# Patient Record
Sex: MALE | Race: WHITE | NOT HISPANIC OR LATINO | Employment: OTHER | URBAN - METROPOLITAN AREA
[De-identification: names, ages, dates, MRNs, and addresses within clinical notes are randomized per-mention and may not be internally consistent; named-entity substitution may affect disease eponyms.]

---

## 2020-05-04 ENCOUNTER — TELEPHONE (OUTPATIENT)
Dept: CARDIOLOGY | Facility: CLINIC | Age: 75
End: 2020-05-04

## 2020-05-04 NOTE — TELEPHONE ENCOUNTER
Received voice mail from patient.  Reached out to patient to let him know his message would be forwarded to Dr Barboza's nurse.

## 2020-05-19 ENCOUNTER — OFFICE VISIT (OUTPATIENT)
Dept: CARDIOLOGY | Facility: CLINIC | Age: 75
End: 2020-05-19
Payer: MEDICARE

## 2020-05-19 VITALS
DIASTOLIC BLOOD PRESSURE: 86 MMHG | BODY MASS INDEX: 24.52 KG/M2 | SYSTOLIC BLOOD PRESSURE: 142 MMHG | HEART RATE: 61 BPM | WEIGHT: 185 LBS | HEIGHT: 73 IN

## 2020-05-19 DIAGNOSIS — I10 ESSENTIAL HYPERTENSION: ICD-10-CM

## 2020-05-19 DIAGNOSIS — E78.00 HYPERCHOLESTEREMIA: ICD-10-CM

## 2020-05-19 DIAGNOSIS — I25.10 ATHEROSCLEROSIS OF NATIVE CORONARY ARTERY OF NATIVE HEART WITHOUT ANGINA PECTORIS: Primary | ICD-10-CM

## 2020-05-19 PROCEDURE — 99204 PR OFFICE/OUTPT VISIT, NEW, LEVL IV, 45-59 MIN: ICD-10-PCS | Mod: S$PBB,,, | Performed by: INTERNAL MEDICINE

## 2020-05-19 PROCEDURE — 99999 PR PBB SHADOW E&M-EST. PATIENT-LVL III: ICD-10-PCS | Mod: PBBFAC,,, | Performed by: INTERNAL MEDICINE

## 2020-05-19 PROCEDURE — 99213 OFFICE O/P EST LOW 20 MIN: CPT | Mod: PBBFAC | Performed by: INTERNAL MEDICINE

## 2020-05-19 PROCEDURE — 99999 PR PBB SHADOW E&M-EST. PATIENT-LVL III: CPT | Mod: PBBFAC,,, | Performed by: INTERNAL MEDICINE

## 2020-05-19 PROCEDURE — 99204 OFFICE O/P NEW MOD 45 MIN: CPT | Mod: S$PBB,,, | Performed by: INTERNAL MEDICINE

## 2020-05-19 RX ORDER — TAMSULOSIN HYDROCHLORIDE 0.4 MG/1
0.4 CAPSULE ORAL DAILY
COMMUNITY
Start: 2020-05-10

## 2020-05-19 RX ORDER — LISINOPRIL 10 MG/1
10 TABLET ORAL DAILY
COMMUNITY
Start: 2020-03-15 | End: 2020-12-14 | Stop reason: SDUPTHER

## 2020-05-19 RX ORDER — ATENOLOL 25 MG/1
25 TABLET ORAL DAILY
COMMUNITY
Start: 2020-04-19 | End: 2021-01-19 | Stop reason: SDUPTHER

## 2020-05-19 RX ORDER — ATORVASTATIN CALCIUM 20 MG/1
20 TABLET, FILM COATED ORAL DAILY
COMMUNITY
Start: 2020-05-10 | End: 2020-06-12 | Stop reason: SDUPTHER

## 2020-05-19 RX ORDER — MECLIZINE HYDROCHLORIDE 25 MG/1
25 TABLET ORAL DAILY PRN
COMMUNITY
Start: 2020-04-16

## 2020-05-19 RX ORDER — DONEPEZIL HYDROCHLORIDE 5 MG/1
5 TABLET, FILM COATED ORAL DAILY
COMMUNITY
Start: 2020-05-10 | End: 2021-05-18

## 2020-05-19 RX ORDER — MONTELUKAST SODIUM 10 MG/1
10 TABLET ORAL DAILY PRN
COMMUNITY

## 2020-06-12 DIAGNOSIS — E78.00 HYPERCHOLESTEREMIA: Primary | ICD-10-CM

## 2020-06-12 RX ORDER — ATORVASTATIN CALCIUM 20 MG/1
20 TABLET, FILM COATED ORAL DAILY
Qty: 90 TABLET | Refills: 3 | Status: SHIPPED | OUTPATIENT
Start: 2020-06-12 | End: 2020-09-14 | Stop reason: SDUPTHER

## 2020-06-12 NOTE — TELEPHONE ENCOUNTER
----- Message from Juliette Osei sent at 6/12/2020 10:55 AM CDT -----  Contact: patient  Patient is requesting a refill on medication.  ATORVASTATIN 20mg.  Patient is completely out of this medication. .

## 2020-09-14 DIAGNOSIS — E78.00 HYPERCHOLESTEREMIA: ICD-10-CM

## 2020-09-14 RX ORDER — ATORVASTATIN CALCIUM 20 MG/1
20 TABLET, FILM COATED ORAL DAILY
Qty: 90 TABLET | Refills: 3 | Status: SHIPPED | OUTPATIENT
Start: 2020-09-14 | End: 2021-10-12

## 2020-11-18 ENCOUNTER — OFFICE VISIT (OUTPATIENT)
Dept: CARDIOLOGY | Facility: CLINIC | Age: 75
End: 2020-11-18
Payer: MEDICARE

## 2020-11-18 VITALS
HEART RATE: 52 BPM | DIASTOLIC BLOOD PRESSURE: 90 MMHG | SYSTOLIC BLOOD PRESSURE: 158 MMHG | WEIGHT: 191.81 LBS | HEIGHT: 73 IN | BODY MASS INDEX: 25.42 KG/M2

## 2020-11-18 DIAGNOSIS — E78.00 HYPERCHOLESTEREMIA: ICD-10-CM

## 2020-11-18 DIAGNOSIS — I25.10 ATHEROSCLEROSIS OF NATIVE CORONARY ARTERY OF NATIVE HEART WITHOUT ANGINA PECTORIS: Primary | ICD-10-CM

## 2020-11-18 DIAGNOSIS — I10 ESSENTIAL HYPERTENSION: ICD-10-CM

## 2020-11-18 PROCEDURE — 99214 PR OFFICE/OUTPT VISIT, EST, LEVL IV, 30-39 MIN: ICD-10-PCS | Mod: 25,S$PBB,, | Performed by: INTERNAL MEDICINE

## 2020-11-18 PROCEDURE — 99999 PR PBB SHADOW E&M-EST. PATIENT-LVL III: CPT | Mod: PBBFAC,,, | Performed by: INTERNAL MEDICINE

## 2020-11-18 PROCEDURE — 99214 OFFICE O/P EST MOD 30 MIN: CPT | Mod: 25,S$PBB,, | Performed by: INTERNAL MEDICINE

## 2020-11-18 PROCEDURE — 99213 OFFICE O/P EST LOW 20 MIN: CPT | Mod: PBBFAC,25 | Performed by: INTERNAL MEDICINE

## 2020-11-18 PROCEDURE — 93010 ELECTROCARDIOGRAM REPORT: CPT | Mod: ,,, | Performed by: INTERNAL MEDICINE

## 2020-11-18 PROCEDURE — 99999 PR PBB SHADOW E&M-EST. PATIENT-LVL III: ICD-10-PCS | Mod: PBBFAC,,, | Performed by: INTERNAL MEDICINE

## 2020-11-18 PROCEDURE — 93005 ELECTROCARDIOGRAM TRACING: CPT

## 2020-11-18 PROCEDURE — 93010 EKG 12-LEAD: ICD-10-PCS | Mod: ,,, | Performed by: INTERNAL MEDICINE

## 2020-11-18 NOTE — PROGRESS NOTES
[facility]    Chief Complaint  Chief Complaint   Patient presents with    Coronary Artery Disease       HPI:    Aches and pains.  But remains active.  No problems with exertional dyspnea or chest discomfort.  Primary limitation seems to be neuropathy of both lower extremities.  Walks 3 miles at least 5 days per week.  More forgetful.  Not sure of Aricept has helped.    Medications  Current Outpatient Medications   Medication Sig Dispense Refill    aspirin (ECOTRIN) 325 MG EC tablet Take 325 mg by mouth once daily.      atenoloL (TENORMIN) 25 MG tablet Take 25 mg by mouth once daily.      atorvastatin (LIPITOR) 20 MG tablet Take 1 tablet (20 mg total) by mouth once daily. 90 tablet 3    diazepam (VALIUM ORAL) Take by mouth.      donepeziL (ARICEPT) 5 MG tablet Take 5 mg by mouth once daily.      lisinopriL 10 MG tablet Take 10 mg by mouth once daily.      meclizine (ANTIVERT) 25 mg tablet Take 25 mg by mouth daily as needed.      montelukast (SINGULAIR) 10 mg tablet Take 10 mg by mouth daily as needed.      tamsulosin (FLOMAX) 0.4 mg Cap Take 0.4 mg by mouth once daily.       No current facility-administered medications for this visit.         History  Past Medical History:   Diagnosis Date    Coronary atherosclerosis     Essential hypertension     Hypercholesteremia     Labyrinthitis     Stroke 2011    diplopia. Neuro-ophtho dx'd possible microstroke    Vertigo      Past Surgical History:   Procedure Laterality Date    CORONARY STENT PLACEMENT  12/18/2000    prox LAD Tetra 3.5 x 18 mm, mid LAD Tetra 3 x 18 mm    CORONARY STENT PLACEMENT  11/22/2004    prox LCx Cypher 2.5 x 13 mm, OM POBA 2 mm     Social History     Socioeconomic History    Marital status: Single     Spouse name: Not on file    Number of children: Not on file    Years of education: Not on file    Highest education level: Not on file   Occupational History    Not on file   Social Needs    Financial resource  strain: Not on file    Food insecurity     Worry: Not on file     Inability: Not on file    Transportation needs     Medical: Not on file     Non-medical: Not on file   Tobacco Use    Smoking status: Former Smoker     Types: Cigars    Smokeless tobacco: Never Used   Substance and Sexual Activity    Alcohol use: Yes     Frequency: 2-3 times a week    Drug use: Not on file    Sexual activity: Not on file   Lifestyle    Physical activity     Days per week: Not on file     Minutes per session: Not on file    Stress: Not on file   Relationships    Social connections     Talks on phone: Not on file     Gets together: Not on file     Attends Anabaptist service: Not on file     Active member of club or organization: Not on file     Attends meetings of clubs or organizations: Not on file     Relationship status: Not on file   Other Topics Concern    Not on file   Social History Narrative    Not on file     Family History   Problem Relation Age of Onset    Heart disease Neg Hx     Hypertension Neg Hx         Allergies  Review of patient's allergies indicates:  No Known Allergies    Review of Systems   Review of Systems   Constitution: Negative for malaise/fatigue, weight gain and weight loss.   Eyes: Negative for visual disturbance.   Cardiovascular: Negative for chest pain, claudication, cyanosis, dyspnea on exertion, irregular heartbeat, leg swelling, near-syncope, orthopnea, palpitations, paroxysmal nocturnal dyspnea and syncope.   Respiratory: Negative for cough, hemoptysis, shortness of breath, sleep disturbances due to breathing and wheezing.    Hematologic/Lymphatic: Negative for bleeding problem. Does not bruise/bleed easily.   Skin: Negative for poor wound healing.   Musculoskeletal: Positive for back pain. Negative for muscle cramps and myalgias.   Gastrointestinal: Negative for abdominal pain, anorexia, diarrhea, heartburn, hematemesis, hematochezia, melena, nausea and vomiting.   Genitourinary:  Negative for hematuria and nocturia.   Neurological: Positive for vertigo (Chronic and unchanged). Negative for excessive daytime sleepiness, dizziness, focal weakness, light-headedness and weakness.   Psychiatric/Behavioral: Positive for memory loss.       Physical Exam  Vitals:    11/18/20 1320   BP: (!) 158/90   Pulse: (!) 52     Wt Readings from Last 1 Encounters:   11/18/20 87 kg (191 lb 12.8 oz)     Physical Exam   Constitutional: He is oriented to person, place, and time. He is cooperative.  Non-toxic appearance. No distress.   HENT:   Head: Normocephalic and atraumatic.   Eyes: Conjunctivae are normal. No scleral icterus.   Neck: Neck supple. No hepatojugular reflux and no JVD present. Carotid bruit is not present. No tracheal deviation and no edema present. No thyromegaly present.   Cardiovascular: Normal rate, regular rhythm, S1 normal and S2 normal.  No extrasystoles are present. PMI is not displaced. Exam reveals no S3 and no S4.   No murmur heard.  Pulses:       Carotid pulses are 2+ on the right side and 2+ on the left side.       Radial pulses are 2+ on the right side and 2+ on the left side.        Dorsalis pedis pulses are 2+ on the right side and 2+ on the left side.        Posterior tibial pulses are 2+ on the right side and 2+ on the left side.   Pulmonary/Chest: No accessory muscle usage. No respiratory distress. He has no decreased breath sounds. He has no wheezes. He has no rhonchi. He has no rales.   Abdominal: Soft. Bowel sounds are normal. He exhibits no pulsatile liver, no abdominal bruit and no pulsatile midline mass. There is no splenomegaly or hepatomegaly. There is no abdominal tenderness.   Musculoskeletal:         General: No tenderness, deformity or edema.   Neurological: He is alert and oriented to person, place, and time. He has normal strength. No cranial nerve deficit or sensory deficit.   Skin: Skin is warm, dry and intact. He is not diaphoretic. No cyanosis. No pallor. Nails  show no clubbing.   Psychiatric: He has a normal mood and affect. His speech is normal and behavior is normal.       Labs  No visits with results within 6 Month(s) from this visit.   Latest known visit with results is:   No results found for any previous visit.       Imaging  No results found.    Assessment  1. Hypercholesteremia  Time for reassessment  - Comprehensive Metabolic Panel  - Lipid Panel    2. Atherosclerosis of native coronary artery of native heart without angina pectoris  Stable    3.  Essential hypertension  Blood pressure is elevated today.  Generally well controlled on current regimen.      Plan and Discussion    He has an appointment with his primary in 10 days.  Will get his blood pressure rechecked at that time.  If still elevated,  would increase lisinopril.  Otherwise, continue current guideline directed medical therapy.    Follow Up  Follow up in about 6 months (around 5/18/2021).      Adolph Barboza MD

## 2020-12-14 DIAGNOSIS — I10 ESSENTIAL HYPERTENSION: Primary | ICD-10-CM

## 2020-12-14 RX ORDER — LISINOPRIL 10 MG/1
10 TABLET ORAL DAILY
Qty: 90 TABLET | Refills: 3 | Status: CANCELLED | OUTPATIENT
Start: 2020-12-14

## 2020-12-14 RX ORDER — LISINOPRIL 10 MG/1
10 TABLET ORAL DAILY
Qty: 90 TABLET | Refills: 3 | Status: SHIPPED | OUTPATIENT
Start: 2020-12-14 | End: 2021-12-09

## 2020-12-14 NOTE — TELEPHONE ENCOUNTER
Left VM to return call and confirm Lisinopril dose for refill request. Make sure no change in dose per LOV with elevated BP reading.

## 2021-01-10 ENCOUNTER — IMMUNIZATION (OUTPATIENT)
Dept: INTERNAL MEDICINE | Facility: CLINIC | Age: 76
End: 2021-01-10
Payer: MEDICARE

## 2021-01-10 DIAGNOSIS — Z23 NEED FOR VACCINATION: ICD-10-CM

## 2021-01-10 PROCEDURE — 91300 COVID-19, MRNA, LNP-S, PF, 30 MCG/0.3 ML DOSE VACCINE: CPT | Mod: PBBFAC

## 2021-01-19 RX ORDER — ATENOLOL 25 MG/1
25 TABLET ORAL DAILY
Qty: 90 TABLET | Refills: 3 | Status: SHIPPED | OUTPATIENT
Start: 2021-01-19 | End: 2021-12-09

## 2021-01-31 ENCOUNTER — IMMUNIZATION (OUTPATIENT)
Dept: INTERNAL MEDICINE | Facility: CLINIC | Age: 76
End: 2021-01-31
Payer: MEDICARE

## 2021-01-31 DIAGNOSIS — Z23 NEED FOR VACCINATION: Primary | ICD-10-CM

## 2021-01-31 PROCEDURE — 0002A PR IMMUNIZ ADMIN, SARS-COV-2 COVID-19 VACC, 30MCG/0.3ML, 2ND DOSE: CPT | Mod: CV19,,, | Performed by: INTERNAL MEDICINE

## 2021-01-31 PROCEDURE — 91300 PR SARS-COV- 2 COVID-19 VACCINE, NO PRSV, 30MCG/0.3ML, IM: ICD-10-PCS | Mod: ,,, | Performed by: INTERNAL MEDICINE

## 2021-01-31 PROCEDURE — 0002A PR IMMUNIZ ADMIN, SARS-COV-2 COVID-19 VACC, 30MCG/0.3ML, 2ND DOSE: ICD-10-PCS | Mod: CV19,,, | Performed by: INTERNAL MEDICINE

## 2021-01-31 PROCEDURE — 91300 PR SARS-COV- 2 COVID-19 VACCINE, NO PRSV, 30MCG/0.3ML, IM: CPT | Mod: ,,, | Performed by: INTERNAL MEDICINE

## 2021-01-31 RX ADMIN — Medication 0.3 ML: at 01:01

## 2021-05-14 ENCOUNTER — TELEPHONE (OUTPATIENT)
Dept: CARDIOLOGY | Facility: CLINIC | Age: 76
End: 2021-05-14

## 2021-05-18 ENCOUNTER — OFFICE VISIT (OUTPATIENT)
Dept: CARDIOLOGY | Facility: CLINIC | Age: 76
End: 2021-05-18
Payer: MEDICARE

## 2021-05-18 VITALS
BODY MASS INDEX: 24.14 KG/M2 | WEIGHT: 183 LBS | HEART RATE: 55 BPM | DIASTOLIC BLOOD PRESSURE: 74 MMHG | SYSTOLIC BLOOD PRESSURE: 132 MMHG

## 2021-05-18 DIAGNOSIS — E78.00 HYPERCHOLESTEREMIA: ICD-10-CM

## 2021-05-18 DIAGNOSIS — I10 ESSENTIAL HYPERTENSION: ICD-10-CM

## 2021-05-18 DIAGNOSIS — I25.10 ATHEROSCLEROSIS OF NATIVE CORONARY ARTERY OF NATIVE HEART WITHOUT ANGINA PECTORIS: Primary | ICD-10-CM

## 2021-05-18 PROCEDURE — 99999 PR PBB SHADOW E&M-EST. PATIENT-LVL III: ICD-10-PCS | Mod: PBBFAC,,, | Performed by: INTERNAL MEDICINE

## 2021-05-18 PROCEDURE — 99214 OFFICE O/P EST MOD 30 MIN: CPT | Mod: S$PBB,,, | Performed by: INTERNAL MEDICINE

## 2021-05-18 PROCEDURE — 99213 OFFICE O/P EST LOW 20 MIN: CPT | Mod: PBBFAC | Performed by: INTERNAL MEDICINE

## 2021-05-18 PROCEDURE — 99999 PR PBB SHADOW E&M-EST. PATIENT-LVL III: CPT | Mod: PBBFAC,,, | Performed by: INTERNAL MEDICINE

## 2021-05-18 PROCEDURE — 99214 PR OFFICE/OUTPT VISIT, EST, LEVL IV, 30-39 MIN: ICD-10-PCS | Mod: S$PBB,,, | Performed by: INTERNAL MEDICINE

## 2021-11-16 ENCOUNTER — OFFICE VISIT (OUTPATIENT)
Dept: CARDIOLOGY | Facility: CLINIC | Age: 76
End: 2021-11-16
Payer: MEDICARE

## 2021-11-16 VITALS
HEIGHT: 73 IN | HEART RATE: 58 BPM | WEIGHT: 179.5 LBS | BODY MASS INDEX: 23.79 KG/M2 | SYSTOLIC BLOOD PRESSURE: 126 MMHG | DIASTOLIC BLOOD PRESSURE: 70 MMHG

## 2021-11-16 DIAGNOSIS — I10 ESSENTIAL HYPERTENSION: ICD-10-CM

## 2021-11-16 DIAGNOSIS — E78.00 HYPERCHOLESTEREMIA: ICD-10-CM

## 2021-11-16 DIAGNOSIS — I25.10 ATHEROSCLEROSIS OF NATIVE CORONARY ARTERY OF NATIVE HEART WITHOUT ANGINA PECTORIS: Primary | ICD-10-CM

## 2021-11-16 PROCEDURE — 99214 PR OFFICE/OUTPT VISIT, EST, LEVL IV, 30-39 MIN: ICD-10-PCS | Mod: S$PBB,,, | Performed by: INTERNAL MEDICINE

## 2021-11-16 PROCEDURE — 99214 OFFICE O/P EST MOD 30 MIN: CPT | Mod: S$PBB,,, | Performed by: INTERNAL MEDICINE

## 2021-11-16 PROCEDURE — 99999 PR PBB SHADOW E&M-EST. PATIENT-LVL III: CPT | Mod: PBBFAC,,, | Performed by: INTERNAL MEDICINE

## 2021-11-16 PROCEDURE — 99213 OFFICE O/P EST LOW 20 MIN: CPT | Mod: PBBFAC | Performed by: INTERNAL MEDICINE

## 2021-11-16 PROCEDURE — 99999 PR PBB SHADOW E&M-EST. PATIENT-LVL III: ICD-10-PCS | Mod: PBBFAC,,, | Performed by: INTERNAL MEDICINE

## 2021-11-16 RX ORDER — DONEPEZIL HYDROCHLORIDE 5 MG/1
5 TABLET, FILM COATED ORAL NIGHTLY
COMMUNITY

## 2021-11-17 ENCOUNTER — TELEPHONE (OUTPATIENT)
Dept: CARDIOLOGY | Facility: CLINIC | Age: 76
End: 2021-11-17
Payer: MEDICARE

## 2021-11-23 ENCOUNTER — TELEPHONE (OUTPATIENT)
Dept: CARDIOLOGY | Facility: CLINIC | Age: 76
End: 2021-11-23
Payer: MEDICARE

## 2021-12-14 ENCOUNTER — OFFICE VISIT (OUTPATIENT)
Dept: CARDIOLOGY | Facility: CLINIC | Age: 76
End: 2021-12-14
Payer: MEDICARE

## 2021-12-14 VITALS
HEART RATE: 60 BPM | OXYGEN SATURATION: 98 % | BODY MASS INDEX: 24.72 KG/M2 | WEIGHT: 187.38 LBS | SYSTOLIC BLOOD PRESSURE: 158 MMHG | DIASTOLIC BLOOD PRESSURE: 84 MMHG

## 2021-12-14 DIAGNOSIS — I25.10 ATHEROSCLEROSIS OF NATIVE CORONARY ARTERY OF NATIVE HEART WITHOUT ANGINA PECTORIS: ICD-10-CM

## 2021-12-14 DIAGNOSIS — I10 ESSENTIAL HYPERTENSION: ICD-10-CM

## 2021-12-14 DIAGNOSIS — I50.31 ACUTE DIASTOLIC HEART FAILURE: Primary | ICD-10-CM

## 2021-12-14 DIAGNOSIS — E78.00 HYPERCHOLESTEREMIA: ICD-10-CM

## 2021-12-14 PROCEDURE — 99214 OFFICE O/P EST MOD 30 MIN: CPT | Mod: S$PBB,,, | Performed by: INTERNAL MEDICINE

## 2021-12-14 PROCEDURE — 99214 PR OFFICE/OUTPT VISIT, EST, LEVL IV, 30-39 MIN: ICD-10-PCS | Mod: S$PBB,,, | Performed by: INTERNAL MEDICINE

## 2021-12-14 PROCEDURE — 99999 PR PBB SHADOW E&M-EST. PATIENT-LVL III: CPT | Mod: PBBFAC,,, | Performed by: INTERNAL MEDICINE

## 2021-12-14 PROCEDURE — 99999 PR PBB SHADOW E&M-EST. PATIENT-LVL III: ICD-10-PCS | Mod: PBBFAC,,, | Performed by: INTERNAL MEDICINE

## 2021-12-14 PROCEDURE — 99213 OFFICE O/P EST LOW 20 MIN: CPT | Mod: PBBFAC | Performed by: INTERNAL MEDICINE

## 2021-12-14 RX ORDER — FUROSEMIDE 40 MG/1
40 TABLET ORAL DAILY
Qty: 30 TABLET | Refills: 0 | Status: SHIPPED | OUTPATIENT
Start: 2021-12-14 | End: 2023-08-31 | Stop reason: SDUPTHER

## 2021-12-20 ENCOUNTER — TELEPHONE (OUTPATIENT)
Dept: CARDIOLOGY | Facility: CLINIC | Age: 76
End: 2021-12-20
Payer: MEDICARE

## 2021-12-30 ENCOUNTER — TELEPHONE (OUTPATIENT)
Dept: CARDIOLOGY | Facility: CLINIC | Age: 76
End: 2021-12-30
Payer: MEDICARE

## 2022-05-17 ENCOUNTER — OFFICE VISIT (OUTPATIENT)
Dept: CARDIOLOGY | Facility: CLINIC | Age: 77
End: 2022-05-17
Payer: MEDICARE

## 2022-05-17 VITALS
HEIGHT: 73 IN | OXYGEN SATURATION: 97 % | BODY MASS INDEX: 23.33 KG/M2 | SYSTOLIC BLOOD PRESSURE: 116 MMHG | DIASTOLIC BLOOD PRESSURE: 70 MMHG | HEART RATE: 53 BPM | WEIGHT: 176 LBS

## 2022-05-17 DIAGNOSIS — E78.00 HYPERCHOLESTEREMIA: ICD-10-CM

## 2022-05-17 DIAGNOSIS — I10 ESSENTIAL HYPERTENSION: ICD-10-CM

## 2022-05-17 DIAGNOSIS — I50.31 ACUTE DIASTOLIC HEART FAILURE: Primary | ICD-10-CM

## 2022-05-17 DIAGNOSIS — I25.10 ATHEROSCLEROSIS OF NATIVE CORONARY ARTERY OF NATIVE HEART WITHOUT ANGINA PECTORIS: ICD-10-CM

## 2022-05-17 PROBLEM — I50.30 DIASTOLIC HEART FAILURE: Status: ACTIVE | Noted: 2022-05-17

## 2022-05-17 PROCEDURE — 99213 OFFICE O/P EST LOW 20 MIN: CPT | Mod: PBBFAC | Performed by: INTERNAL MEDICINE

## 2022-05-17 PROCEDURE — 99999 PR PBB SHADOW E&M-EST. PATIENT-LVL III: CPT | Mod: PBBFAC,,, | Performed by: INTERNAL MEDICINE

## 2022-05-17 PROCEDURE — 99214 PR OFFICE/OUTPT VISIT, EST, LEVL IV, 30-39 MIN: ICD-10-PCS | Mod: S$PBB,,, | Performed by: INTERNAL MEDICINE

## 2022-05-17 PROCEDURE — 99999 PR PBB SHADOW E&M-EST. PATIENT-LVL III: ICD-10-PCS | Mod: PBBFAC,,, | Performed by: INTERNAL MEDICINE

## 2022-05-17 PROCEDURE — 99214 OFFICE O/P EST MOD 30 MIN: CPT | Mod: S$PBB,,, | Performed by: INTERNAL MEDICINE

## 2022-05-17 NOTE — PROGRESS NOTES
[facility]    Chief Complaint  No chief complaint on file.      HPI:    After his office visit in December, he took Lasix for 5 days as prescribed.  Swelling and dyspnea resolved.  No problems since.  Has been avoiding excess sodium.  Not exercising much lately because his wife just had shoulder surgery and he is helping with her care.  No exertional dyspnea or chest discomfort.    Medications  Current Outpatient Medications   Medication Sig Dispense Refill    aspirin (ECOTRIN) 325 MG EC tablet Take 325 mg by mouth once daily.      atenoloL (TENORMIN) 25 MG tablet TAKE ONE TABLET BY MOUTH EVERY DAY 90 tablet 3    atorvastatin (LIPITOR) 20 MG tablet TAKE ONE TABLET BY MOUTH EVERY DAY 90 tablet 3    diazepam (VALIUM ORAL) Take by mouth.      furosemide (LASIX) 40 MG tablet Take 1 tablet (40 mg total) by mouth once daily. 30 tablet 0    lisinopriL 10 MG tablet TAKE ONE TABLET BY MOUTH EVERY DAY 90 tablet 3    meclizine (ANTIVERT) 25 mg tablet Take 25 mg by mouth daily as needed.      montelukast (SINGULAIR) 10 mg tablet Take 10 mg by mouth daily as needed.      tamsulosin (FLOMAX) 0.4 mg Cap Take 0.4 mg by mouth once daily.      donepeziL (ARICEPT) 5 MG tablet Take 5 mg by mouth every evening.       No current facility-administered medications for this visit.        History  Past Medical History:   Diagnosis Date    Coronary atherosclerosis     Diastolic heart failure     Essential hypertension     Hypercholesteremia     Labyrinthitis     Stroke 2011    diplopia. Neuro-ophtho dx'd possible microstroke    Vertigo      Past Surgical History:   Procedure Laterality Date    CORONARY STENT PLACEMENT  12/18/2000    prox LAD Tetra 3.5 x 18 mm, mid LAD Tetra 3 x 18 mm    CORONARY STENT PLACEMENT  11/22/2004    prox LCx Cypher 2.5 x 13 mm, OM POBA 2 mm     Social History     Socioeconomic History    Marital status:    Tobacco Use    Smoking status: Former Smoker     Types: Cigars     Smokeless tobacco: Never Used   Substance and Sexual Activity    Alcohol use: Yes     Family History   Problem Relation Age of Onset    Heart disease Neg Hx     Hypertension Neg Hx         Allergies  Review of patient's allergies indicates:  No Known Allergies    Review of Systems   Review of Systems   Constitutional: Negative for malaise/fatigue, weight gain and weight loss.   Eyes: Negative for visual disturbance.   Cardiovascular: Negative for chest pain, claudication, cyanosis, dyspnea on exertion, irregular heartbeat, leg swelling, near-syncope, orthopnea, palpitations, paroxysmal nocturnal dyspnea and syncope.   Respiratory: Negative for cough, hemoptysis, shortness of breath, sleep disturbances due to breathing and wheezing.    Hematologic/Lymphatic: Negative for bleeding problem. Does not bruise/bleed easily.   Skin: Negative for poor wound healing.   Musculoskeletal: Negative for muscle cramps and myalgias.   Gastrointestinal: Negative for abdominal pain, anorexia, diarrhea, heartburn, hematemesis, hematochezia, melena, nausea and vomiting.   Genitourinary: Negative for hematuria and nocturia.   Neurological: Negative for excessive daytime sleepiness, dizziness, focal weakness, light-headedness and weakness.       Physical Exam  Vitals:    05/17/22 1140   BP: 116/70   Pulse: (!) 53     Wt Readings from Last 1 Encounters:   05/17/22 79.8 kg (176 lb)     Physical Exam  Constitutional:       General: He is not in acute distress.     Appearance: He is not toxic-appearing or diaphoretic.   HENT:      Head: Normocephalic and atraumatic.   Eyes:      General: No scleral icterus.     Conjunctiva/sclera: Conjunctivae normal.   Neck:      Thyroid: No thyromegaly.      Vascular: No carotid bruit, hepatojugular reflux or JVD.      Trachea: Trachea normal.   Cardiovascular:      Rate and Rhythm: Normal rate and regular rhythm.  No extrasystoles are present.     Chest Wall: PMI is not displaced.      Pulses:            Carotid pulses are 2+ on the right side and 2+ on the left side.       Radial pulses are 2+ on the right side and 2+ on the left side.        Dorsalis pedis pulses are 2+ on the right side and 2+ on the left side.        Posterior tibial pulses are 2+ on the right side and 2+ on the left side.      Heart sounds: S1 normal and S2 normal. No murmur heard.    No S3 or S4 sounds.   Pulmonary:      Effort: No accessory muscle usage or respiratory distress.      Breath sounds: No decreased breath sounds, wheezing, rhonchi or rales.   Abdominal:      General: Bowel sounds are normal. There is no abdominal bruit.      Palpations: Abdomen is soft. There is no hepatomegaly, splenomegaly or pulsatile mass.      Tenderness: There is no abdominal tenderness.   Musculoskeletal:         General: No tenderness or deformity.      Right lower leg: No edema.      Left lower leg: No edema.   Skin:     General: Skin is warm and dry.      Coloration: Skin is not cyanotic or pale.      Nails: There is no clubbing.   Neurological:      General: No focal deficit present.      Mental Status: He is alert and oriented to person, place, and time.   Psychiatric:         Attention and Perception: Attention normal.         Mood and Affect: Mood normal.         Speech: Speech normal.         Behavior: Behavior normal. Behavior is cooperative.         Labs  No visits with results within 6 Month(s) from this visit.   Latest known visit with results is:   No results found for any previous visit.       Imaging  No results found.    Assessment  1. Acute diastolic heart failure  Resolved    2. Hypercholesteremia  Controlled    3. Atherosclerosis of native coronary artery of native heart without angina pectoris  Stable    4. Essential hypertension  Controlled      Plan and Discussion    Continue current guideline directed medical therapy.  Reinforced sodium restriction.  Reduce aspirin to 81 mg daily.    The ASCVD Risk score (Fatoumata DC Jr., et al., 2013)  failed to calculate for the following reasons:    Cannot find a previous HDL lab    Cannot find a previous total cholesterol lab     Follow Up  Follow up in about 6 months (around 11/17/2022).      Adolph Barboza MD

## 2022-10-10 DIAGNOSIS — E78.00 HYPERCHOLESTEREMIA: ICD-10-CM

## 2022-10-10 RX ORDER — ATORVASTATIN CALCIUM 20 MG/1
20 TABLET, FILM COATED ORAL NIGHTLY
Qty: 90 TABLET | Refills: 3 | Status: SHIPPED | OUTPATIENT
Start: 2022-10-10 | End: 2023-10-09

## 2022-10-11 ENCOUNTER — TELEPHONE (OUTPATIENT)
Dept: CARDIOLOGY | Facility: CLINIC | Age: 77
End: 2022-10-11
Payer: MEDICARE

## 2022-10-11 NOTE — TELEPHONE ENCOUNTER
Returned pt call w/no answer, left VM to call back to discuss details of CP or report to ER for further evaluation.

## 2022-10-11 NOTE — TELEPHONE ENCOUNTER
----- Message from Jerry Alejandra sent at 10/11/2022  3:35 PM CDT -----  Regarding: Pt Advice  Contact: CHAYO CALABRESE [[id number]]  Name of Who is Calling: CHAYO CALABRESE [[id number]]      What is the request in detail: Would like to speak with staff in regards to having chest pain after 3 mins of bike exercise at PT. Please advise      Can the clinic reply by [facility]: no      [facility]: [phone]

## 2022-10-13 ENCOUNTER — OFFICE VISIT (OUTPATIENT)
Dept: CARDIOLOGY | Facility: CLINIC | Age: 77
End: 2022-10-13
Payer: MEDICARE

## 2022-10-13 VITALS — SYSTOLIC BLOOD PRESSURE: 132 MMHG | WEIGHT: 186 LBS | BODY MASS INDEX: 24.54 KG/M2 | DIASTOLIC BLOOD PRESSURE: 80 MMHG

## 2022-10-13 DIAGNOSIS — E78.00 HYPERCHOLESTEREMIA: ICD-10-CM

## 2022-10-13 DIAGNOSIS — I10 ESSENTIAL HYPERTENSION: ICD-10-CM

## 2022-10-13 DIAGNOSIS — I50.32 CHRONIC DIASTOLIC HEART FAILURE: ICD-10-CM

## 2022-10-13 DIAGNOSIS — I25.118 ATHEROSCLEROSIS OF NATIVE CORONARY ARTERY OF NATIVE HEART WITH STABLE ANGINA PECTORIS: Primary | ICD-10-CM

## 2022-10-13 PROCEDURE — 99213 OFFICE O/P EST LOW 20 MIN: CPT | Mod: PBBFAC | Performed by: INTERNAL MEDICINE

## 2022-10-13 PROCEDURE — 99215 OFFICE O/P EST HI 40 MIN: CPT | Mod: S$PBB,,, | Performed by: INTERNAL MEDICINE

## 2022-10-13 PROCEDURE — 99999 PR PBB SHADOW E&M-EST. PATIENT-LVL III: CPT | Mod: PBBFAC,,, | Performed by: INTERNAL MEDICINE

## 2022-10-13 PROCEDURE — 93010 EKG 12-LEAD: ICD-10-PCS | Mod: ,,, | Performed by: INTERNAL MEDICINE

## 2022-10-13 PROCEDURE — 93010 ELECTROCARDIOGRAM REPORT: CPT | Mod: ,,, | Performed by: INTERNAL MEDICINE

## 2022-10-13 PROCEDURE — 93005 ELECTROCARDIOGRAM TRACING: CPT

## 2022-10-13 PROCEDURE — 99215 PR OFFICE/OUTPT VISIT, EST, LEVL V, 40-54 MIN: ICD-10-PCS | Mod: S$PBB,,, | Performed by: INTERNAL MEDICINE

## 2022-10-13 PROCEDURE — 99999 PR PBB SHADOW E&M-EST. PATIENT-LVL III: ICD-10-PCS | Mod: PBBFAC,,, | Performed by: INTERNAL MEDICINE

## 2022-10-13 RX ORDER — SODIUM CHLORIDE 9 MG/ML
INJECTION, SOLUTION INTRAVENOUS CONTINUOUS
Status: CANCELLED | OUTPATIENT
Start: 2022-10-13

## 2022-10-13 NOTE — PATIENT INSTRUCTIONS
Procedure: cardiac catheterization/angiogram  Procedure date:  Procedure time:    Ar[facility] at 2 hours prior to procedure.  Nothing to eat or drink after midnight.  Take all morning medication with a sip of water.  If you are diabetic, do not take any diabetes medication the morning of the procedure.   Please make arrangements for a family member or friend to bring you home after the procedure. You will not be able to drive home.        If you have any questions, please call our office at [phone].

## 2022-10-13 NOTE — PROGRESS NOTES
[facility]    Chief Complaint  Chief Complaint   Patient presents with    Coronary Artery Disease       HPI:    Comes in because of chest discomfort.  Had an episode when he recently started physical therapy.  Was riding on an exercise bike for about 3 minutes.  Developed substernal/epigastric discomfort which radiated into both of his arms.  No associated nausea, vomiting, dyspnea, or diaphoresis.  Discomfort resolved quickly with rest.  Has been having similar symptoms with activities such as raking leaves.  Also occurs in the evening after he has eaten.  No rest episodes.  Has not been doing his regular walking because of knee problems.  Has not been taking any Lasix as he denies problems with swelling.  No paroxysmal nocturnal dyspnea orthopnea.  Blood pressure has been controlled.  His last coronary angiography was in May 2013.  At that time stents in the anterior descending and circumflex system were widely patent.  Had a borderline lesion in the mid anterior descending artery which was deemed nonsignificant by FFR.    Medications  Current Outpatient Medications   Medication Sig Dispense Refill    aspirin (ECOTRIN) 325 MG EC tablet Take 325 mg by mouth once daily.      atenoloL (TENORMIN) 25 MG tablet TAKE ONE TABLET BY MOUTH EVERY DAY 90 tablet 3    atorvastatin (LIPITOR) 20 MG tablet Take 1 tablet (20 mg total) by mouth every evening. 90 tablet 3    diazepam (VALIUM ORAL) Take by mouth.      donepeziL (ARICEPT) 5 MG tablet Take 5 mg by mouth every evening.      furosemide (LASIX) 40 MG tablet Take 1 tablet (40 mg total) by mouth once daily. (Patient not taking: Reported on 10/13/2022) 30 tablet 0    lisinopriL 10 MG tablet TAKE ONE TABLET BY MOUTH EVERY DAY 90 tablet 3    meclizine (ANTIVERT) 25 mg tablet Take 25 mg by mouth daily as needed.      montelukast (SINGULAIR) 10 mg tablet Take 10 mg by mouth daily as needed.      tamsulosin (FLOMAX) 0.4 mg Cap Take 0.4 mg by mouth once daily.        No current facility-administered medications for this visit.        History  Past Medical History:   Diagnosis Date    Coronary atherosclerosis     Diastolic heart failure     Essential hypertension     Hypercholesteremia     Labyrinthitis     Stroke 2011    diplopia. Neuro-ophtho dx'd possible microstroke    Vertigo      Past Surgical History:   Procedure Laterality Date    CORONARY STENT PLACEMENT  12/18/2000    prox LAD Tetra 3.5 x 18 mm, mid LAD Tetra 3 x 18 mm    CORONARY STENT PLACEMENT  11/22/2004    prox LCx Cypher 2.5 x 13 mm, OM POBA 2 mm     Social History     Socioeconomic History    Marital status:    Tobacco Use    Smoking status: Former     Types: Cigars    Smokeless tobacco: Never   Substance and Sexual Activity    Alcohol use: Yes     Family History   Problem Relation Age of Onset    Heart disease Neg Hx     Hypertension Neg Hx         Allergies  Review of patient's allergies indicates:  No Known Allergies    Review of Systems   Review of Systems   Constitutional: Negative for malaise/fatigue, weight gain and weight loss.   Eyes:  Negative for visual disturbance.   Cardiovascular:  Positive for chest pain. Negative for claudication, cyanosis, dyspnea on exertion, irregular heartbeat, leg swelling, near-syncope, orthopnea, palpitations, paroxysmal nocturnal dyspnea and syncope.   Respiratory:  Negative for cough, hemoptysis, shortness of breath, sleep disturbances due to breathing and wheezing.    Hematologic/Lymphatic: Negative for bleeding problem. Does not bruise/bleed easily.   Skin:  Negative for poor wound healing.   Musculoskeletal:  Negative for muscle cramps and myalgias.   Gastrointestinal:  Negative for abdominal pain, anorexia, diarrhea, heartburn, hematemesis, hematochezia, melena, nausea and vomiting.   Genitourinary:  Negative for hematuria and nocturia.   Neurological:  Negative for excessive daytime sleepiness, dizziness, focal weakness, light-headedness and weakness.      Physical Exam  Vitals:    10/13/22 1112   BP: 132/80     Wt Readings from Last 1 Encounters:   10/13/22 84.4 kg (186 lb)     Physical Exam  Constitutional:       General: He is not in acute distress.     Appearance: He is not toxic-appearing or diaphoretic.   HENT:      Head: Normocephalic and atraumatic.   Eyes:      General: No scleral icterus.     Conjunctiva/sclera: Conjunctivae normal.   Neck:      Thyroid: No thyromegaly.      Vascular: No carotid bruit, hepatojugular reflux or JVD.      Trachea: Trachea normal.   Cardiovascular:      Rate and Rhythm: Normal rate and regular rhythm. No extrasystoles are present.     Chest Wall: PMI is not displaced.      Pulses:           Carotid pulses are 2+ on the right side and 2+ on the left side.       Radial pulses are 2+ on the right side and 2+ on the left side.        Dorsalis pedis pulses are 2+ on the right side and 2+ on the left side.        Posterior tibial pulses are 2+ on the right side and 2+ on the left side.      Heart sounds: S1 normal and S2 normal. No murmur heard.    No S3 or S4 sounds.   Pulmonary:      Effort: No accessory muscle usage or respiratory distress.      Breath sounds: No decreased breath sounds, wheezing, rhonchi or rales.   Abdominal:      General: Bowel sounds are normal. There is no abdominal bruit.      Palpations: Abdomen is soft. There is no hepatomegaly, splenomegaly or pulsatile mass.      Tenderness: There is no abdominal tenderness.   Musculoskeletal:         General: No tenderness or deformity.      Right lower le+ Edema present.      Left lower le+ Edema present.   Skin:     General: Skin is warm and dry.      Coloration: Skin is not cyanotic or pale.      Nails: There is no clubbing.   Neurological:      General: No focal deficit present.      Mental Status: He is alert and oriented to person, place, and time.   Psychiatric:         Attention and Perception: Attention normal.         Mood and Affect: Mood normal.          Speech: Speech normal.         Behavior: Behavior normal. Behavior is cooperative.       Labs  No visits with results within 6 Month(s) from this visit.   Latest known visit with results is:   No results found for any previous visit.       Imaging  No results found.    Assessment  1. Atherosclerosis of native coronary artery of native heart with stable angina pectoris  New onset angina had a stable but low level of exercise  - IN OFFICE EKG 12-LEAD (to Muse)    2. Hypercholesteremia  Controlled    3. Essential hypertension  Controlled    4. Chronic diastolic heart failure  Compensated  - Echo; Future      Plan and Discussion    Plan for an echocardiogram to assess left ventricular contractility.  Cardiac catheterization. The procedure including its risks, benefits and alternatives were discussed in detail.  All questions were answered.  After shared decision making, appropriate consents were signed.        Adolph Barboza MD

## 2022-10-14 ENCOUNTER — TELEPHONE (OUTPATIENT)
Dept: CARDIOLOGY | Facility: CLINIC | Age: 77
End: 2022-10-14
Payer: MEDICARE

## 2022-10-14 NOTE — TELEPHONE ENCOUNTER
----- Message from Paris Ghosh sent at 10/14/2022  3:00 PM CDT -----  Regarding: Patient advice              Name of Who is Calling:  CHAYO CALABRESE    Who Left The Message:   CHAYO CALABRESE      What is the request in detail:    Patient called requesting his original appointment date moved back to 10/19/2022 for Angiogram.  Please further advise.   Thank you!      Reply by [facility]: NO      Preferred Call Back : [phone] (m)

## 2022-10-14 NOTE — TELEPHONE ENCOUNTER
----- Message from Brandy Grewal RN sent at 10/13/2022  3:42 PM CDT -----  Contact: CHAYO CALABRESE [[id number]]    ----- Message -----  From: Yue Murray  Sent: 10/13/2022   1:48 PM CDT  To: Jabari Farfan Staff    Type: Call Back      Who called: CHAYO CALABRESE [[id number]]      What is the request in detail: Patient is requesting a call back from Brandy in regards to his visit with the dr and to follow up. Please advise.       Can the clinic reply by [facility]? No      Would the patient rather a call back or a response via [facility]? Call back       Best call back number: [phone] (home) [phone] (work)      Additional Information:

## 2022-10-14 NOTE — TELEPHONE ENCOUNTER
Scheduled angiogram w/pt 10/26/22 @11:00. Reviewed pre-cath instruction handout over phone, he verbalized understanding.

## 2022-10-18 ENCOUNTER — HOSPITAL ENCOUNTER (OUTPATIENT)
Dept: CARDIOLOGY | Facility: OTHER | Age: 77
Discharge: HOME | End: 2022-10-18
Attending: INTERNAL MEDICINE
Payer: MEDICARE

## 2022-10-18 VITALS
BODY MASS INDEX: 24.65 KG/M2 | WEIGHT: 186 LBS | HEIGHT: 73 IN | DIASTOLIC BLOOD PRESSURE: 80 MMHG | SYSTOLIC BLOOD PRESSURE: 132 MMHG

## 2022-10-18 DIAGNOSIS — I50.32 CHRONIC DIASTOLIC HEART FAILURE: ICD-10-CM

## 2022-10-18 LAB
ASCENDING AORTA: 2.74 CM
AV INDEX (PROSTH): 0.97
AV MEAN GRADIENT: 2 MMHG
AV PEAK GRADIENT: 3 MMHG
AV REGURGITATION PRESSURE HALF TIME: 749.39 MS
AV VALVE AREA: 3.08 CM2
AV VELOCITY RATIO: 0.94
BSA FOR ECHO PROCEDURE: 2.08 M2
CV ECHO LV RWT: 0.39 CM
DOP CALC AO PEAK VEL: 0.83 M/S
DOP CALC AO VTI: 21 CM
DOP CALC LVOT AREA: 3.2 CM2
DOP CALC LVOT DIAMETER: 2.01 CM
DOP CALC LVOT PEAK VEL: 0.78 M/S
DOP CALC LVOT STROKE VOLUME: 64.7 CM3
DOP CALC MV VTI: 28.5 CM
DOP CALCLVOT PEAK VEL VTI: 20.4 CM
E WAVE DECELERATION TIME: 189.31 MSEC
E/A RATIO: 1.16
E/E' RATIO: 10.47 M/S
ECHO LV POSTERIOR WALL: 0.93 CM (ref 0.6–1.1)
EJECTION FRACTION: 55 %
FRACTIONAL SHORTENING: 28 % (ref 28–44)
INTERVENTRICULAR SEPTUM: 0.92 CM (ref 0.6–1.1)
IVC DIAMETER: 1.13 CM
IVRT: 73.82 MSEC
LA MAJOR: 4.16 CM
LA MINOR: 5.44 CM
LA WIDTH: 3.8 CM
LEFT ATRIUM SIZE: 3.03 CM
LEFT ATRIUM VOLUME INDEX MOD: 27.8 ML/M2
LEFT ATRIUM VOLUME INDEX: 22.1 ML/M2
LEFT ATRIUM VOLUME MOD: 58 CM3
LEFT ATRIUM VOLUME: 46.14 CM3
LEFT INTERNAL DIMENSION IN SYSTOLE: 3.42 CM (ref 2.1–4)
LEFT VENTRICLE DIASTOLIC VOLUME INDEX: 49.84 ML/M2
LEFT VENTRICLE DIASTOLIC VOLUME: 104.17 ML
LEFT VENTRICLE MASS INDEX: 72 G/M2
LEFT VENTRICLE SYSTOLIC VOLUME INDEX: 23.1 ML/M2
LEFT VENTRICLE SYSTOLIC VOLUME: 48.23 ML
LEFT VENTRICULAR INTERNAL DIMENSION IN DIASTOLE: 4.74 CM (ref 3.5–6)
LEFT VENTRICULAR MASS: 150.11 G
LV LATERAL E/E' RATIO: 12.71 M/S
LV SEPTAL E/E' RATIO: 8.9 M/S
LVOT MG: 1.35 MMHG
LVOT MV: 0.55 CM/S
MV MEAN GRADIENT: 1 MMHG
MV PEAK A VEL: 0.77 M/S
MV PEAK E VEL: 0.89 M/S
MV PEAK GRADIENT: 3 MMHG
MV STENOSIS PRESSURE HALF TIME: 54.9 MS
MV VALVE AREA BY CONTINUITY EQUATION: 2.27 CM2
MV VALVE AREA P 1/2 METHOD: 4.01 CM2
PISA AR MAX VEL: 2.83 M/S
PISA MRMAX VEL: 3.18 M/S
PISA TR MAX VEL: 2.51 M/S
PULM VEIN S/D RATIO: 0.96
PV PEAK D VEL: 0.55 M/S
PV PEAK S VEL: 0.53 M/S
PV PEAK VELOCITY: 1.08 CM/S
RA MAJOR: 4.02 CM
RA PRESSURE: 3 MMHG
RA WIDTH: 3 CM
RIGHT VENTRICULAR END-DIASTOLIC DIMENSION: 3.21 CM
SINUS: 3.5 CM
STJ: 2.01 CM
TDI LATERAL: 0.07 M/S
TDI SEPTAL: 0.1 M/S
TDI: 0.09 M/S
TR MAX PG: 25 MMHG
TRICUSPID ANNULAR PLANE SYSTOLIC EXCURSION: 2.5 CM
TV REST PULMONARY ARTERY PRESSURE: 28 MMHG

## 2022-10-18 PROCEDURE — 93306 TTE W/DOPPLER COMPLETE: CPT

## 2022-10-18 PROCEDURE — 93306 ECHO (CUPID ONLY): ICD-10-PCS | Mod: 26,,, | Performed by: INTERNAL MEDICINE

## 2022-10-18 PROCEDURE — 93306 TTE W/DOPPLER COMPLETE: CPT | Mod: 26,,, | Performed by: INTERNAL MEDICINE

## 2022-10-21 ENCOUNTER — TELEPHONE (OUTPATIENT)
Dept: CARDIOLOGY | Facility: CLINIC | Age: 77
End: 2022-10-21
Payer: MEDICARE

## 2022-10-21 NOTE — TELEPHONE ENCOUNTER
Returned call to patient. Notified of the time for his procedure on the Oct. 26 at 9am.    ----- Message from Ally Garcia sent at 10/19/2022  9:44 AM CDT -----  Regarding: Procedure time  Name of Who is Calling:CHAYO CALABRESE [[id number]]          What is the request in detail: Pt is having a procedure 10/26. He is calling to find out what time he is supposed to be there. Please assist           Can the clinic reply by [facility]:yes          [facility] Back [facility][phone]

## 2022-10-24 NOTE — PRE ADMISSION SCREENING
Pre admit phone call completed.    Instructions given to patient about NPO status as follows:     The evening before surgery do not eat anything after midnight ( this includes hard candy, chewing gum and mints).        Patient was also instructed on the below information:    Park in the Parking lot behind the hospital or in the [facility] across the street from the parking lot.  Parking is complimentary.  If you will be discharged the same day as your procedure, please arrange for a responsible adult to drive you home or  to accompany you if traveling by taxi.  YOU WILL NOT BE PERMITTED TO DRIVE OR TO LEAVE THE HOSPITAL ALONE AFTER SURGERY.  It is strongly recommended that you arrange for someone to remain with you for the first 24 hrs following your surgery.  2 visitor policy.  Take am meds including aspirin.     Patient verbalized understanding of above instructions.

## 2022-10-26 ENCOUNTER — HOSPITAL ENCOUNTER (OUTPATIENT)
Facility: OTHER | Age: 77
Discharge: HOME | End: 2022-10-26
Attending: INTERNAL MEDICINE | Admitting: INTERNAL MEDICINE
Payer: MEDICARE

## 2022-10-26 VITALS
OXYGEN SATURATION: 98 % | HEIGHT: 73 IN | HEART RATE: 49 BPM | BODY MASS INDEX: 23.33 KG/M2 | SYSTOLIC BLOOD PRESSURE: 129 MMHG | DIASTOLIC BLOOD PRESSURE: 68 MMHG | TEMPERATURE: 98 F | WEIGHT: 176 LBS | RESPIRATION RATE: 18 BRPM

## 2022-10-26 DIAGNOSIS — I25.118 ATHEROSCLEROSIS OF NATIVE CORONARY ARTERY OF NATIVE HEART WITH STABLE ANGINA PECTORIS: ICD-10-CM

## 2022-10-26 LAB
POC ACTIVATED CLOTTING TIME K: 283 SEC (ref 74–137)
SAMPLE: ABNORMAL

## 2022-10-26 PROCEDURE — 93454 CORONARY ARTERY ANGIO S&I: CPT | Mod: 59 | Performed by: INTERNAL MEDICINE

## 2022-10-26 PROCEDURE — 25000003 PHARM REV CODE 250: Performed by: INTERNAL MEDICINE

## 2022-10-26 PROCEDURE — 99152 MOD SED SAME PHYS/QHP 5/>YRS: CPT | Performed by: INTERNAL MEDICINE

## 2022-10-26 PROCEDURE — C1769 GUIDE WIRE: HCPCS | Performed by: INTERNAL MEDICINE

## 2022-10-26 PROCEDURE — 25500020 PHARM REV CODE 255

## 2022-10-26 PROCEDURE — 99153 MOD SED SAME PHYS/QHP EA: CPT | Performed by: INTERNAL MEDICINE

## 2022-10-26 PROCEDURE — C1894 INTRO/SHEATH, NON-LASER: HCPCS | Performed by: INTERNAL MEDICINE

## 2022-10-26 PROCEDURE — 93454 CORONARY ARTERY ANGIO S&I: CPT | Mod: 26,59,51, | Performed by: INTERNAL MEDICINE

## 2022-10-26 PROCEDURE — C1874 STENT, COATED/COV W/DEL SYS: HCPCS | Performed by: INTERNAL MEDICINE

## 2022-10-26 PROCEDURE — C1725 CATH, TRANSLUMIN NON-LASER: HCPCS | Performed by: INTERNAL MEDICINE

## 2022-10-26 PROCEDURE — 92928 PR STENT: ICD-10-PCS | Mod: LC,,, | Performed by: INTERNAL MEDICINE

## 2022-10-26 PROCEDURE — 99152 MOD SED SAME PHYS/QHP 5/>YRS: CPT | Mod: ,,, | Performed by: INTERNAL MEDICINE

## 2022-10-26 PROCEDURE — C9600 PERC DRUG-EL COR STENT SING: HCPCS | Mod: LC | Performed by: INTERNAL MEDICINE

## 2022-10-26 PROCEDURE — 63600175 PHARM REV CODE 636 W HCPCS: Performed by: INTERNAL MEDICINE

## 2022-10-26 PROCEDURE — 93454 PR CATH PLACE/CORONARY ANGIO, IMG SUPER/INTERP: ICD-10-PCS | Mod: 26,59,51, | Performed by: INTERNAL MEDICINE

## 2022-10-26 PROCEDURE — 92928 PRQ TCAT PLMT NTRAC ST 1 LES: CPT | Mod: LC,,, | Performed by: INTERNAL MEDICINE

## 2022-10-26 PROCEDURE — C1887 CATHETER, GUIDING: HCPCS | Performed by: INTERNAL MEDICINE

## 2022-10-26 PROCEDURE — 99152 PR MOD CONSCIOUS SEDATION, SAME PHYS, 5+ YRS, FIRST 15 MIN: ICD-10-PCS | Mod: ,,, | Performed by: INTERNAL MEDICINE

## 2022-10-26 DEVICE — STENT RONYX27518UX RESOLUTE ONYX 2.75X18
Type: IMPLANTABLE DEVICE | Site: HEART | Status: FUNCTIONAL
Brand: RESOLUTE ONYX™

## 2022-10-26 RX ORDER — ONDANSETRON 8 MG/1
8 TABLET, ORALLY DISINTEGRATING ORAL EVERY 8 HOURS PRN
Status: DISCONTINUED | OUTPATIENT
Start: 2022-10-26 | End: 2022-10-26 | Stop reason: HOSPADM

## 2022-10-26 RX ORDER — CLOPIDOGREL 300 MG/1
TABLET, FILM COATED ORAL
Status: DISCONTINUED | OUTPATIENT
Start: 2022-10-26 | End: 2022-10-26 | Stop reason: HOSPADM

## 2022-10-26 RX ORDER — HEPARIN SODIUM 1000 [USP'U]/ML
INJECTION, SOLUTION INTRAVENOUS; SUBCUTANEOUS
Status: DISCONTINUED | OUTPATIENT
Start: 2022-10-26 | End: 2022-10-26 | Stop reason: HOSPADM

## 2022-10-26 RX ORDER — SODIUM CHLORIDE 9 MG/ML
INJECTION, SOLUTION INTRAVENOUS CONTINUOUS
Status: DISCONTINUED | OUTPATIENT
Start: 2022-10-26 | End: 2022-10-26 | Stop reason: HOSPADM

## 2022-10-26 RX ORDER — MIDAZOLAM HYDROCHLORIDE 1 MG/ML
INJECTION INTRAMUSCULAR; INTRAVENOUS
Status: DISCONTINUED | OUTPATIENT
Start: 2022-10-26 | End: 2022-10-26 | Stop reason: HOSPADM

## 2022-10-26 RX ORDER — SODIUM CHLORIDE 9 MG/ML
INJECTION, SOLUTION INTRAVENOUS CONTINUOUS
Status: ACTIVE | OUTPATIENT
Start: 2022-10-26 | End: 2022-10-26

## 2022-10-26 RX ORDER — LIDOCAINE HYDROCHLORIDE 10 MG/ML
INJECTION, SOLUTION EPIDURAL; INFILTRATION; INTRACAUDAL; PERINEURAL
Status: DISCONTINUED | OUTPATIENT
Start: 2022-10-26 | End: 2022-10-26 | Stop reason: HOSPADM

## 2022-10-26 RX ORDER — FENTANYL CITRATE 50 UG/ML
INJECTION, SOLUTION INTRAMUSCULAR; INTRAVENOUS
Status: DISCONTINUED | OUTPATIENT
Start: 2022-10-26 | End: 2022-10-26 | Stop reason: HOSPADM

## 2022-10-26 RX ORDER — HEPARIN SOD,PORCINE/0.9 % NACL 1000/500ML
INTRAVENOUS SOLUTION INTRAVENOUS
Status: DISCONTINUED | OUTPATIENT
Start: 2022-10-26 | End: 2022-10-26 | Stop reason: HOSPADM

## 2022-10-26 RX ORDER — CLOPIDOGREL BISULFATE 75 MG/1
75 TABLET ORAL DAILY
Qty: 90 TABLET | Refills: 3 | Status: SHIPPED | OUTPATIENT
Start: 2022-10-26 | End: 2023-08-01

## 2022-10-26 RX ORDER — ACETAMINOPHEN 325 MG/1
650 TABLET ORAL EVERY 4 HOURS PRN
Status: DISCONTINUED | OUTPATIENT
Start: 2022-10-26 | End: 2022-10-26 | Stop reason: HOSPADM

## 2022-10-26 RX ADMIN — SODIUM CHLORIDE: 0.9 INJECTION, SOLUTION INTRAVENOUS at 09:10

## 2022-10-26 NOTE — DISCHARGE SUMMARY
[facility] - [facility] Lab ([place])  Discharge Note  Short Stay    Procedure(s) (LRB):  ANGIOGRAM, CORONARY ARTERY (Right)  Stent, Drug Eluting, Single Vessel, Coronary      OUTCOME: Patient tolerated treatment/procedure well without complication and is now ready for discharge.    DISPOSITION: Home or Self Care    FINAL DIAGNOSIS:  Coronary atherosclerosis with stable angina    FOLLOWUP: In clinic    DISCHARGE INSTRUCTIONS:    Discharge Procedure Orders   Diet general     Remove dressing in 24 hours        TIME SPENT ON DISCHARGE: 15 minutes

## 2022-10-26 NOTE — Clinical Note
The catheter was inserted into the and was removed from the ostium   right coronary artery. An angiography was performed of the right coronary arteries.

## 2022-10-26 NOTE — PLAN OF CARE
Doroteo Judge has met all discharge criteria from Phase II. Vital Signs are stable, ambulating  without difficulty. Discharge instructions given, patient verbalized understanding. Discharged from facility via wheelchair in stable condition.

## 2022-10-26 NOTE — OR NURSING
Began releasing air from radial band at 1530. Bruising noted around incision site, tissue soft and no hematoma. Wrist began oozing, band re-inflated. Dr. Barboza notified, no new orders, will come see patient.

## 2022-10-26 NOTE — Clinical Note
160 ml of contrast were injected throughout the case. 40 mL of contrast was the total wasted during the case. 200 mL was the total amount used during the case.

## 2022-10-26 NOTE — Clinical Note
The catheter was inserted into the and was removed from the ostium   left main. An angiography was performed of the left coronary arteries.

## 2022-11-17 ENCOUNTER — OFFICE VISIT (OUTPATIENT)
Dept: CARDIOLOGY | Facility: CLINIC | Age: 77
End: 2022-11-17
Payer: MEDICARE

## 2022-11-17 VITALS
SYSTOLIC BLOOD PRESSURE: 142 MMHG | OXYGEN SATURATION: 98 % | WEIGHT: 186.63 LBS | DIASTOLIC BLOOD PRESSURE: 78 MMHG | BODY MASS INDEX: 24.62 KG/M2 | HEART RATE: 57 BPM

## 2022-11-17 DIAGNOSIS — I50.33 ACUTE ON CHRONIC DIASTOLIC HEART FAILURE: ICD-10-CM

## 2022-11-17 DIAGNOSIS — I10 PRIMARY HYPERTENSION: ICD-10-CM

## 2022-11-17 DIAGNOSIS — I25.118 ATHEROSCLEROSIS OF NATIVE CORONARY ARTERY OF NATIVE HEART WITH STABLE ANGINA PECTORIS: Primary | ICD-10-CM

## 2022-11-17 DIAGNOSIS — E78.00 HYPERCHOLESTEREMIA: ICD-10-CM

## 2022-11-17 PROCEDURE — 99213 OFFICE O/P EST LOW 20 MIN: CPT | Mod: PBBFAC | Performed by: INTERNAL MEDICINE

## 2022-11-17 PROCEDURE — 99214 PR OFFICE/OUTPT VISIT, EST, LEVL IV, 30-39 MIN: ICD-10-PCS | Mod: S$PBB,,, | Performed by: INTERNAL MEDICINE

## 2022-11-17 PROCEDURE — 99999 PR PBB SHADOW E&M-EST. PATIENT-LVL III: ICD-10-PCS | Mod: PBBFAC,,, | Performed by: INTERNAL MEDICINE

## 2022-11-17 PROCEDURE — 99999 PR PBB SHADOW E&M-EST. PATIENT-LVL III: CPT | Mod: PBBFAC,,, | Performed by: INTERNAL MEDICINE

## 2022-11-17 PROCEDURE — 99214 OFFICE O/P EST MOD 30 MIN: CPT | Mod: S$PBB,,, | Performed by: INTERNAL MEDICINE

## 2022-11-17 RX ORDER — LISINOPRIL 20 MG/1
20 TABLET ORAL DAILY
COMMUNITY
Start: 2022-10-24

## 2022-11-17 NOTE — PROGRESS NOTES
[facility] CARDIOLOGY    Chief Complaint  Chief Complaint   Patient presents with    Coronary Artery Disease       HPI:    Has been feeling well since his most recent coronary stent.  Angina has resolved.  Says he now needs an MRI because of possible sciatica causing the pain.  So has not gone back to physical therapy.  I asked him to defer the MRI for 3 months after stent implantation.  Has noted some lower extremity edema.  Denies sodium excess.    Medications  Current Outpatient Medications   Medication Sig Dispense Refill    aspirin 81 mg Cap Take 81 mg by mouth once daily.      atenoloL (TENORMIN) 25 MG tablet TAKE ONE TABLET BY MOUTH EVERY DAY 90 tablet 3    atorvastatin (LIPITOR) 20 MG tablet Take 1 tablet (20 mg total) by mouth every evening. 90 tablet 3    clopidogreL (PLAVIX) 75 mg tablet Take 1 tablet (75 mg total) by mouth once daily. 90 tablet 3    diazepam (VALIUM ORAL) Take by mouth daily as needed.      donepeziL (ARICEPT) 5 MG tablet Take 5 mg by mouth every evening.      furosemide (LASIX) 40 MG tablet Take 1 tablet (40 mg total) by mouth once daily. 30 tablet 0    lisinopriL (PRINIVIL,ZESTRIL) 20 MG tablet Take 20 mg by mouth once daily.      meclizine (ANTIVERT) 25 mg tablet Take 25 mg by mouth daily as needed.      montelukast (SINGULAIR) 10 mg tablet Take 10 mg by mouth daily as needed.      tamsulosin (FLOMAX) 0.4 mg Cap Take 0.4 mg by mouth once daily.       No current facility-administered medications for this visit.        History  Past Medical History:   Diagnosis Date    Coronary atherosclerosis     Diastolic heart failure     Essential hypertension     Hypercholesteremia     Labyrinthitis     Stroke 2011    diplopia. Neuro-ophtho dx'd possible microstroke    Vertigo      Past Surgical History:   Procedure Laterality Date    CORONARY ANGIOGRAPHY Right 10/26/2022    Procedure: ANGIOGRAM, CORONARY ARTERY;  Surgeon: Adolph Barboza MD;  Location: [facility] CATH LAB;  Service: Cardiology;   Laterality: Right;  right radial    CORONARY STENT PLACEMENT  12/18/2000    prox LAD Tetra 3.5 x 18 mm, mid LAD Tetra 3 x 18 mm    CORONARY STENT PLACEMENT  11/22/2004    prox LCx Cypher 2.5 x 13 mm, OM POBA 2 mm     Social History     Socioeconomic History    Marital status:    Tobacco Use    Smoking status: Former     Types: Cigars    Smokeless tobacco: Never   Substance and Sexual Activity    Alcohol use: Yes     Family History   Problem Relation Age of Onset    Heart disease Neg Hx     Hypertension Neg Hx         Allergies  Review of patient's allergies indicates:  No Known Allergies    Review of Systems   Review of Systems   Constitutional: Negative for malaise/fatigue, weight gain and weight loss.   Eyes:  Negative for visual disturbance.   Cardiovascular:  Positive for leg swelling. Negative for chest pain, claudication, cyanosis, dyspnea on exertion, irregular heartbeat, near-syncope, orthopnea, palpitations, paroxysmal nocturnal dyspnea and syncope.   Respiratory:  Negative for cough, hemoptysis, shortness of breath, sleep disturbances due to breathing and wheezing.    Hematologic/Lymphatic: Negative for bleeding problem. Does not bruise/bleed easily.   Skin:  Negative for poor wound healing.   Musculoskeletal:  Negative for muscle cramps and myalgias.   Gastrointestinal:  Negative for abdominal pain, anorexia, diarrhea, heartburn, hematemesis, hematochezia, melena, nausea and vomiting.   Genitourinary:  Negative for hematuria and nocturia.   Neurological:  Negative for excessive daytime sleepiness, dizziness, focal weakness, light-headedness and weakness.     Physical Exam  Vitals:    11/17/22 1158   BP: (!) 142/78   Pulse: (!) 57     Wt Readings from Last 1 Encounters:   11/17/22 84.6 kg (186 lb 9.6 oz)     Physical Exam  Vitals and nursing note reviewed.   Constitutional:       General: He is not in acute distress.     Appearance: He is not toxic-appearing or diaphoretic.   HENT:      Head:  Normocephalic and atraumatic.      Mouth/Throat:      Lips: Pink.      Mouth: Mucous membranes are moist.   Eyes:      General: No scleral icterus.     Conjunctiva/sclera: Conjunctivae normal.   Neck:      Thyroid: No thyromegaly.      Vascular: No carotid bruit, hepatojugular reflux or JVD.      Trachea: Trachea normal.   Cardiovascular:      Rate and Rhythm: Normal rate and regular rhythm. No extrasystoles are present.     Chest Wall: PMI is not displaced.      Pulses:           Carotid pulses are 2+ on the right side and 2+ on the left side.       Radial pulses are 2+ on the right side and 2+ on the left side.        Dorsalis pedis pulses are 2+ on the right side and 2+ on the left side.        Posterior tibial pulses are 2+ on the right side and 2+ on the left side.      Heart sounds: S1 normal and S2 normal. No murmur heard.    No friction rub. No S3 or S4 sounds.   Pulmonary:      Effort: Pulmonary effort is normal. No tachypnea, bradypnea, accessory muscle usage or respiratory distress.      Breath sounds: Normal breath sounds and air entry. No decreased breath sounds, wheezing, rhonchi or rales.   Abdominal:      General: Bowel sounds are normal. There is no distension or abdominal bruit.      Palpations: Abdomen is soft. There is no hepatomegaly, splenomegaly or pulsatile mass.      Tenderness: There is no abdominal tenderness.   Musculoskeletal:         General: No tenderness or deformity.      Right lower le+ Edema present.      Left lower le+ Edema present.   Skin:     General: Skin is warm and dry.      Capillary Refill: Capillary refill takes less than 2 seconds.      Coloration: Skin is not cyanotic or pale.      Nails: There is no clubbing.   Neurological:      General: No focal deficit present.      Mental Status: He is alert and oriented to person, place, and time.   Psychiatric:         Attention and Perception: Attention normal.         Mood and Affect: Mood normal.         Speech: Speech  normal.         Behavior: Behavior normal. Behavior is cooperative.       Labs  Admission on 10/26/2022, Discharged on 10/26/2022   Component Date Value Ref Range Status    POC ACTIVATED CLOTTING TIME K 10/26/2022 283 (H)  74 - 137 sec Final    Sample 10/26/2022 unknown   Final   Hospital Outpatient Visit on 10/18/2022   Component Date Value Ref Range Status    BSA 10/18/2022 2.08  m2 Final    TDI SEPTAL 10/18/2022 0.10  m/s Final    LV LATERAL E/E' RATIO 10/18/2022 12.71  m/s Final    LV SEPTAL E/E' RATIO 10/18/2022 8.90  m/s Final    LA WIDTH 10/18/2022 3.80  cm Final    Right Atrial Pressure (from IVC) 10/18/2022 3  mmHg Final    IVC diameter 10/18/2022 1.13  cm Final    Left Ventricular Outflow Tract Lu* 10/18/2022 0.55  cm/s Final    Left Ventricular Outflow Tract Lu* 10/18/2022 1.35  mmHg Final    TDI LATERAL 10/18/2022 0.07  m/s Final    PV PEAK VELOCITY 10/18/2022 1.08  cm/s Final    LVIDd 10/18/2022 4.74  3.5 - 6.0 cm Final    IVS 10/18/2022 0.92  0.6 - 1.1 cm Final    Posterior Wall 10/18/2022 0.93  0.6 - 1.1 cm Final    LVIDs 10/18/2022 3.42  2.1 - 4.0 cm Final    FS 10/18/2022 28  28 - 44 % Final    LA volume 10/18/2022 46.14  cm3 Final    Sinus 10/18/2022 3.50  cm Final    STJ 10/18/2022 2.01  cm Final    Ascending aorta 10/18/2022 2.74  cm Final    LV mass 10/18/2022 150.11  g Final    LA size 10/18/2022 3.03  cm Final    RVDD 10/18/2022 3.21  cm Final    TAPSE 10/18/2022 2.50  cm Final    Left Ventricle Relative Wall Thick* 10/18/2022 0.39  cm Final    AV regurgitation pressure 1/2 time 10/18/2022 749.767437717048380  ms Final    AV mean gradient 10/18/2022 2  mmHg Final    AV valve area 10/18/2022 3.08  cm2 Final    AV Velocity Ratio 10/18/2022 0.94   Final    AV index (prosthetic) 10/18/2022 0.97   Final    MV mean gradient 10/18/2022 1  mmHg Final    MV valve area p 1/2 method 10/18/2022 4.01  cm2 Final    MV valve area by continuity eq 10/18/2022 2.27  cm2 Final    E/A ratio 10/18/2022 1.16    Final    Mean e' 10/18/2022 0.09  m/s Final    E wave deceleration time 10/18/2022 189.31  msec Final    IVRT 10/18/2022 73.82  msec Final    Pulm vein S/D ratio 10/18/2022 0.96   Final    LVOT diameter 10/18/2022 2.01  cm Final    LVOT area 10/18/2022 3.2  cm2 Final    LVOT peak brown 10/18/2022 0.78  m/s Final    LVOT peak VTI 10/18/2022 20.40  cm Final    Ao peak brown 10/18/2022 0.83  m/s Final    Ao VTI 10/18/2022 21.0  cm Final    Mr max brown 10/18/2022 3.18  m/s Final    LVOT stroke volume 10/18/2022 64.70  cm3 Final    AV peak gradient 10/18/2022 3  mmHg Final    MV peak gradient 10/18/2022 3  mmHg Final    TV rest pulmonary artery pressure 10/18/2022 28  mmHg Final    E/E' ratio 10/18/2022 10.47  m/s Final    MV Peak E Brown 10/18/2022 0.89  m/s Final    AR Max Brown 10/18/2022 2.83  m/s Final    TR Max Brown 10/18/2022 2.51  m/s Final    MV VTI 10/18/2022 28.5  cm Final    MV stenosis pressure 1/2 time 10/18/2022 54.90  ms Final    MV Peak A Brown 10/18/2022 0.77  m/s Final    PV Peak S Brown 10/18/2022 0.53  m/s Final    PV Peak D Brown 10/18/2022 0.55  m/s Final    LV Systolic Volume 10/18/2022 48.23  mL Final    LV Systolic Volume Index 10/18/2022 23.1  mL/m2 Final    LV Diastolic Volume 10/18/2022 104.17  mL Final    LV Diastolic Volume Index 10/18/2022 49.84  mL/m2 Final    LA Volume Index 10/18/2022 22.1  mL/m2 Final    LV Mass Index 10/18/2022 72  g/m2 Final    RA Major [place] 10/18/2022 4.02  cm Final    Left Atrium Minor Axis 10/18/2022 5.44  cm Final    Left Atrium Major Axis 10/18/2022 4.16  cm Final    Triscuspid Valve Regurgitation Pea* 10/18/2022 25  mmHg Final    LA Volume Index (Mod) 10/18/2022 27.8  mL/m2 Final    LA volume (mod) 10/18/2022 58.00  cm3 Final    RA Width 10/18/2022 3.00  cm Final    EF 10/18/2022 55  % Final       Imaging  Cardiac catheterization    Result Date: 10/26/2022    Patent stents in the anterior descending artery.   99% InStent restenosis of the proximal left circumflex artery  successfully treated with implantation of an avril 2.75 x 18 mm drug-eluting stent. The procedure log was documented by No documenter listed and verified by Adolph Barboza MD. Date: 10/26/2022  Time: 1:10 PM       Assessment  1. Atherosclerosis of native coronary artery of native heart with stable angina pectoris  Angina resolves after revascularization    2. Acute on chronic diastolic heart failure  Mildly decompensated    3. Hypercholesteremia  Controlled    4. Primary hypertension  Elevated today likely due to volume overload      Plan and Discussion    Resume Lasix daily until lower extremity edema resolves.  Continue other guideline directed medical therapy.    The 10-year ASCVD risk score (Taylor DK, et al., 2019) is: 37%    Values used to calculate the score:      Age: 77 years      Sex: Male      Is Non- : No      Diabetic: No      Tobacco smoker: No      Systolic Blood Pressure: 142 mmHg      Is BP treated: Yes      HDL Cholesterol: 59 mg/dL      Total Cholesterol: 133 mg/dL     Follow Up  Follow up in about 4 weeks (around 12/15/2022).      Adolph Barboza MD

## 2022-11-28 ENCOUNTER — TELEPHONE (OUTPATIENT)
Dept: CARDIOLOGY | Facility: CLINIC | Age: 77
End: 2022-11-28
Payer: MEDICARE

## 2022-11-28 NOTE — TELEPHONE ENCOUNTER
----- Message from Sydnee Juan sent at 11/28/2022  9:39 AM CST -----  Regarding: questions and concerns  Name of Who is Calling: Doroteo           What is the request in detail: Patient is requesting a call back. He stated that his Orthopedic want him to have a MRI of his back and he want to know the time period he can get it done since he just had procedure 10/26.           Can the clinic reply by [facility]: No           What Number to Call Back if not in [facility]: [phone]

## 2022-11-28 NOTE — TELEPHONE ENCOUNTER
Pt reminded per last visit that he should wait 3 months post stent placement to have MRI. He replied that's what he thought but wanted to make certain.

## 2022-12-20 ENCOUNTER — OFFICE VISIT (OUTPATIENT)
Dept: CARDIOLOGY | Facility: CLINIC | Age: 77
End: 2022-12-20
Payer: MEDICARE

## 2022-12-20 VITALS
OXYGEN SATURATION: 98 % | WEIGHT: 187.19 LBS | BODY MASS INDEX: 24.7 KG/M2 | SYSTOLIC BLOOD PRESSURE: 130 MMHG | HEART RATE: 68 BPM | DIASTOLIC BLOOD PRESSURE: 68 MMHG

## 2022-12-20 DIAGNOSIS — I25.10 ATHEROSCLEROSIS OF NATIVE CORONARY ARTERY OF NATIVE HEART WITHOUT ANGINA PECTORIS: ICD-10-CM

## 2022-12-20 DIAGNOSIS — I10 PRIMARY HYPERTENSION: ICD-10-CM

## 2022-12-20 DIAGNOSIS — E78.00 HYPERCHOLESTEREMIA: ICD-10-CM

## 2022-12-20 DIAGNOSIS — I50.33 ACUTE ON CHRONIC DIASTOLIC HEART FAILURE: Primary | ICD-10-CM

## 2022-12-20 PROCEDURE — 99215 PR OFFICE/OUTPT VISIT, EST, LEVL V, 40-54 MIN: ICD-10-PCS | Mod: S$PBB,,, | Performed by: INTERNAL MEDICINE

## 2022-12-20 PROCEDURE — 99215 OFFICE O/P EST HI 40 MIN: CPT | Mod: S$PBB,,, | Performed by: INTERNAL MEDICINE

## 2022-12-20 PROCEDURE — 99999 PR PBB SHADOW E&M-EST. PATIENT-LVL III: CPT | Mod: PBBFAC,,, | Performed by: INTERNAL MEDICINE

## 2022-12-20 PROCEDURE — 99999 PR PBB SHADOW E&M-EST. PATIENT-LVL III: ICD-10-PCS | Mod: PBBFAC,,, | Performed by: INTERNAL MEDICINE

## 2022-12-20 PROCEDURE — 99213 OFFICE O/P EST LOW 20 MIN: CPT | Mod: PBBFAC | Performed by: INTERNAL MEDICINE

## 2022-12-20 RX ORDER — CYCLOBENZAPRINE HCL 10 MG
10 TABLET ORAL NIGHTLY PRN
COMMUNITY
Start: 2022-12-05

## 2022-12-20 NOTE — PROGRESS NOTES
[facility]    Chief Complaint  Chief Complaint   Patient presents with    Congestive Heart Failure       HPI:    Swelling resolved after a few doses of diuretics.  No angina.  Has resumed physical therapy.    Medications  Current Outpatient Medications   Medication Sig Dispense Refill    aspirin 81 mg Cap Take 81 mg by mouth once daily.      atenoloL (TENORMIN) 25 MG tablet TAKE ONE TABLET BY MOUTH EVERY DAY 90 tablet 3    atorvastatin (LIPITOR) 20 MG tablet Take 1 tablet (20 mg total) by mouth every evening. 90 tablet 3    clopidogreL (PLAVIX) 75 mg tablet Take 1 tablet (75 mg total) by mouth once daily. 90 tablet 3    cyclobenzaprine (FLEXERIL) 10 MG tablet Take 10 mg by mouth nightly as needed.      diazepam (VALIUM ORAL) Take by mouth daily as needed.      donepeziL (ARICEPT) 5 MG tablet Take 5 mg by mouth every evening.      furosemide (LASIX) 40 MG tablet Take 1 tablet (40 mg total) by mouth once daily. 30 tablet 0    lisinopriL (PRINIVIL,ZESTRIL) 20 MG tablet Take 20 mg by mouth once daily.      meclizine (ANTIVERT) 25 mg tablet Take 25 mg by mouth daily as needed.      montelukast (SINGULAIR) 10 mg tablet Take 10 mg by mouth daily as needed.      tamsulosin (FLOMAX) 0.4 mg Cap Take 0.4 mg by mouth once daily.       No current facility-administered medications for this visit.        History  Past Medical History:   Diagnosis Date    Coronary atherosclerosis     Diastolic heart failure     Essential hypertension     Hypercholesteremia     Labyrinthitis     Stroke 2011    diplopia. Neuro-ophtho dx'd possible microstroke    Vertigo      Past Surgical History:   Procedure Laterality Date    CORONARY ANGIOGRAPHY Right 10/26/2022    Procedure: ANGIOGRAM, CORONARY ARTERY;  Surgeon: Adolph Barboza MD;  Location: [facility] CATH LAB;  Service: Cardiology;  Laterality: Right;  right radial    CORONARY STENT PLACEMENT  12/18/2000    prox LAD Tetra 3.5 x 18 mm, mid LAD Tetra 3 x 18 mm    CORONARY STENT  Problem: Mobility Impaired (Adult and Pediatric)  Goal: *Acute Goals and Plan of Care  Description  Acute PT Goals:  (1.)Ac Garcia  will move from supine to sit and sit to supine , scoot up and down and roll side to side with MODIFIED INDEPENDENCE within 7 treatment day(s). (2.)Ac Garcia will transfer from bed to chair and chair to bed with MODIFIED INDEPENDENCE using the least restrictive device within 7 treatment day(s). (3.)Ac Garcia will ambulate with MODIFIED INDEPENDENCE for 250+ feet with the least restrictive device within 7 treatment day(s). (4.)Ac Garcia will perform standing static and dynamic balance activities x 23 minutes with MODIFIED INDEPENDENCE to improve safety and activity tolerance within 7 treatment day(s). (5.)Ac Garcia will ascend and descend 2 stairs using bilateral hand rail(s) with MODIFIED INDEPENDENCE to improve functional mobility and safety within 7 treatment day(s). (6.)Ac Garcia will perform bilateral lower extremity exercises x 15 min for HEP with MODIFIED INDEPENDENCE to improve strength, endurance, and functional mobility within 7 treatment day(s). PHYSICAL THERAPY: Daily Note and AM 7/18/2019  INPATIENT: PT Visit Days : 2  [place] / [place] / Product Type: Medicare /       NAME/AGE/GENDER: Kevin Thayer is a 78 y.o. male   PRIMARY DIAGNOSIS: JAZZY (acute kidney injury) ([facility] [N17.9]  Nausea with vomiting [R11.2] JAZZY (acute kidney injury) ([facility] [address]   JAZZY (acute kidney injury) ([facility] [address]         ICD-10: Treatment Diagnosis:   · Generalized Muscle Weakness (M62.81)  · Difficulty in walking, Not elsewhere classified (R26.2)  · Other abnormalities of gait and mobility (R26.89)   Precaution/Allergies:  Lortab [hydrocodone-acetaminophen]; Lortab [hydrocodone-acetaminophen]; and Other medication      ASSESSMENT:     Mr. Getachew Garcia is an 78year old M who presents to hospital with nausea, vomiting, SOB and weakness.  Pt PLACEMENT  11/22/2004    prox LCx Cypher 2.5 x 13 mm, OM POBA 2 mm     Social History     Socioeconomic History    Marital status:    Tobacco Use    Smoking status: Former     Types: Cigars    Smokeless tobacco: Never   Substance and Sexual Activity    Alcohol use: Yes     Family History   Problem Relation Age of Onset    Heart disease Neg Hx     Hypertension Neg Hx         Allergies  Review of patient's allergies indicates:  No Known Allergies    Review of Systems   Review of Systems   Constitutional: Negative for malaise/fatigue, weight gain and weight loss.   Eyes:  Negative for visual disturbance.   Cardiovascular:  Negative for chest pain, claudication, cyanosis, dyspnea on exertion, irregular heartbeat, leg swelling, near-syncope, orthopnea, palpitations, paroxysmal nocturnal dyspnea and syncope.   Respiratory:  Negative for cough, hemoptysis, shortness of breath, sleep disturbances due to breathing and wheezing.    Hematologic/Lymphatic: Negative for bleeding problem. Does not bruise/bleed easily.   Skin:  Negative for poor wound healing.   Musculoskeletal:  Positive for falls. Negative for muscle cramps and myalgias.   Gastrointestinal:  Negative for abdominal pain, anorexia, diarrhea, heartburn, hematemesis, hematochezia, melena, nausea and vomiting.   Genitourinary:  Negative for hematuria and nocturia.   Neurological:  Negative for excessive daytime sleepiness, dizziness, focal weakness, light-headedness and weakness.     Physical Exam  Vitals:    12/20/22 1114   BP: 130/68   Pulse: 68     Wt Readings from Last 1 Encounters:   12/20/22 84.9 kg (187 lb 3.2 oz)     Physical Exam  Vitals and nursing note reviewed.   Constitutional:       General: He is not in acute distress.     Appearance: He is not toxic-appearing or diaphoretic.   HENT:      Head: Normocephalic and atraumatic.      Mouth/Throat:      Lips: Pink.      Mouth: Mucous membranes are moist.   Eyes:      General: No scleral icterus.      recently diagnosed with stage 3 lung cancer, hx of prostate cancer. Prior to hospital admission pt lives alone in a one story home with no step(s) to enter. Pt endorses no falls in past 6 months. Prior to admission Mr. Getachew Garcia uses usually no DME for mobility, but recently due to onset of weakness he has been using a walker for mobility. He also reports using \"a little bit\" of oxygen at baseline. 7/18- pt presents in supine without specific complaints at rest. Needs encouragement to work with therapy, stating he will get nauseas when he sits up. Transfers to sitting with SBA. Intact seated balance. Does c/o nausea in sitting and RN quickly brought anti-nausea medication. No vomiting noted. Pt declines much mobility today, did stand and take a few steps to chair. RT arrived, pt currently on 4L and with O2 sats high 80's and requiring 6L to keep sats in low 90's. Suggested holding much more mobility/ambulation at this time. Long discussion with grandson outside of room- he would like to clarify goals of care and solidify a plan for dc home. Unclear whether pt wants outpatient oncology workup + HH PT or is leaning towards hospice. Discussed with case management/hospitalist. Will continue with therapy efforts during hospital stay. This section established at most recent assessment   PROBLEM LIST (Impairments causing functional limitations):  1. Decreased Strength  2. Decreased Transfer Abilities  3. Decreased Ambulation Ability/Technique  4. Decreased Balance  5. Increased Pain  6. Decreased Activity Tolerance   INTERVENTIONS PLANNED: (Benefits and precautions of physical therapy have been discussed with the patient.)  1. Balance Exercise  2. Bed Mobility  3. Family Education  4. Gait Training  5. Home Exercise Program (HEP)  6. Manual Therapy  7. Neuromuscular Re-education/Strengthening  8. Range of Motion (ROM)  9. Therapeutic Activites  10. Therapeutic Exercise/Strengthening  11.  Transfer Training     TREATMENT Conjunctiva/sclera: Conjunctivae normal.   Neck:      Thyroid: No thyromegaly.      Vascular: No carotid bruit, hepatojugular reflux or JVD.      Trachea: Trachea normal.   Cardiovascular:      Rate and Rhythm: Normal rate and regular rhythm. No extrasystoles are present.     Chest Wall: PMI is not displaced.      Pulses:           Carotid pulses are 2+ on the right side and 2+ on the left side.       Radial pulses are 2+ on the right side and 2+ on the left side.        Dorsalis pedis pulses are 2+ on the right side and 2+ on the left side.        Posterior tibial pulses are 2+ on the right side and 2+ on the left side.      Heart sounds: S1 normal and S2 normal. No murmur heard.    No friction rub. No S3 or S4 sounds.   Pulmonary:      Effort: Pulmonary effort is normal. No tachypnea, bradypnea, accessory muscle usage or respiratory distress.      Breath sounds: Normal breath sounds and air entry. No decreased breath sounds, wheezing, rhonchi or rales.   Abdominal:      General: Bowel sounds are normal. There is no distension or abdominal bruit.      Palpations: Abdomen is soft. There is no hepatomegaly, splenomegaly or pulsatile mass.      Tenderness: There is no abdominal tenderness.   Musculoskeletal:         General: No tenderness or deformity.      Right lower le+ Edema present.      Left lower le+ Edema present.   Skin:     General: Skin is warm and dry.      Capillary Refill: Capillary refill takes less than 2 seconds.      Coloration: Skin is not cyanotic or pale.      Nails: There is no clubbing.   Neurological:      General: No focal deficit present.      Mental Status: He is alert and oriented to person, place, and time.   Psychiatric:         Attention and Perception: Attention normal.         Mood and Affect: Mood normal.         Speech: Speech normal.         Behavior: Behavior normal. Behavior is cooperative.       Labs  Admission on 10/26/2022, Discharged on 10/26/2022   Component Date  PLAN: Frequency/Duration: 3 times a week for duration of hospital stay  Rehabilitation Potential For Stated Goals: Good     REHAB RECOMMENDATIONS (at time of discharge pending progress):    Placement: It is my opinion, based on this patient's performance to date, that Mr. Davey Stephenson may benefit from intensive therapy at a [facility] after discharge due to the functional deficits listed above that are likely to improve with skilled rehabilitation and concerns that he/she may be unsafe to be unsupervised at home due to medical complications and mobility deficits which impact his level of function and put him at increased risk of falling and/or functional decline. Equipment:    None at this time              HISTORY:   History of Present Injury/Illness (Reason for Referral):  Per H&P: \"Mr. Davey Stephenson is an 79 y/o male with hx prostate cancer, CAD, afib, CHF, GERD,  HTN, HLD, AICD who was recently diagnosed with stage 3 lung cancer earlier this month. This week he has had nausea and vomiting and tonight is the second ER visit this week for same. He is unable to keep food or liquids down. No abdominal pain. No constipation or diarrhea. Denies fever or chills. He has worsening JAZZY with a Bun 56/Cr2.20. Will admit for further treatment. \"  Past Medical History/Comorbidities:   Mr. Davey Stephenson  has a past medical history of Abnormal EKG, Acute kidney injury ([address] AICD (automatic cardioverter/defibrillator) present, Alterations of sensations, Anemia, Arthritis, Back pain (6/17/2016), Benign prostatic hyperplasia (6/17/2016), Bilateral pubic rami fractures ([address] CAD (coronary artery disease), CHF (congestive heart failure) ([address] Chronic obstructive pulmonary disease ([address] Chronic systolic CHF (congestive heart failure) ([address] (10/7/2014), CRI (chronic renal insufficiency), Depression, Dizziness, Dyspnea, Elevated ferritin, Elevated PSA (6/17/2016), GERD (gastroesophageal reflux disease), Heart failure ([address] Hernia, inguinal (6/17/2016), Hyperlipidemia (10/19/2015), Hypertension, Hypokalemia (6/17/2016), Ischemia of left lower extremity (3/13/2019), Keratosis, actinic, Leg cramps, Malaise and fatigue (6/17/2016), NSTEMI (non-ST elevated myocardial infarction) ([facility] [address] (6/24/2019), OA (osteoarthritis) of hip (9/16/2016), Orthostasis, Pneumonia, Respiratory failure ([facility] [address] (6/17/2016), Sinoatrial node dysfunction ([facility] [address] and Sinusitis. Mr. Cleola Baumgarten  has a past surgical history that includes hx pacemaker; hx cataract removal; hx other surgical (4/2006); hx other surgical; hx other surgical; hx orthopaedic; hx carpal tunnel release; hx heart catheterization; hx cataract removal; hx colonoscopy; hx other surgical; and vascular surgery procedure unlist (03/13/2019). Social History/Living Environment:   Home Environment: Private residence  # Steps to Enter: 0  One/Two Story Residence: One story  Living Alone: Yes  Support Systems: Family member(s)  Patient Expects to be Discharged to[de-identified] Unknown  Current DME Used/Available at Home: Walker, rollator, Chris Gehrig, straight, Tub transfer bench, Grab bars, Raised toilet seat  Tub or Shower Type: Tub/Shower combination  Prior Level of Function/Work/Activity:  Independent, lives alone. Number of Personal Factors/Comorbidities that affect the Plan of Care: 1-2: MODERATE COMPLEXITY   EXAMINATION:   Most Recent Physical Functioning:   Gross Assessment:                  Posture:     Balance:  Sitting: Intact  Standing: Impaired  Standing - Static: Fair  Standing - Dynamic : Fair Bed Mobility:  Supine to Sit: Stand-by assistance  Scooting: Stand-by assistance  Wheelchair Mobility:     Transfers:  Sit to Stand: Contact guard assistance  Stand to Sit: Contact guard assistance  Bed to Chair: Contact guard assistance  Interventions: Verbal cues; Safety awareness training  Duration: 20 Minutes  Gait:     Base of Support: Center of gravity altered  Speed/Tonya: Pace decreased (<100 feet/min); Slow  Gait Abnormalities: Value Ref Range Status    POC ACTIVATED CLOTTING TIME K 10/26/2022 283 (H)  74 - 137 sec Final    Sample 10/26/2022 unknown   Final   Hospital Outpatient Visit on 10/18/2022   Component Date Value Ref Range Status    BSA 10/18/2022 2.08  m2 Final    TDI SEPTAL 10/18/2022 0.10  m/s Final    LV LATERAL E/E' RATIO 10/18/2022 12.71  m/s Final    LV SEPTAL E/E' RATIO 10/18/2022 8.90  m/s Final    LA WIDTH 10/18/2022 3.80  cm Final    Right Atrial Pressure (from IVC) 10/18/2022 3  mmHg Final    IVC diameter 10/18/2022 1.13  cm Final    Left Ventricular Outflow Tract Lu* 10/18/2022 0.55  cm/s Final    Left Ventricular Outflow Tract Lu* 10/18/2022 1.35  mmHg Final    TDI LATERAL 10/18/2022 0.07  m/s Final    PV PEAK VELOCITY 10/18/2022 1.08  cm/s Final    LVIDd 10/18/2022 4.74  3.5 - 6.0 cm Final    IVS 10/18/2022 0.92  0.6 - 1.1 cm Final    Posterior Wall 10/18/2022 0.93  0.6 - 1.1 cm Final    LVIDs 10/18/2022 3.42  2.1 - 4.0 cm Final    FS 10/18/2022 28  28 - 44 % Final    LA volume 10/18/2022 46.14  cm3 Final    Sinus 10/18/2022 3.50  cm Final    STJ 10/18/2022 2.01  cm Final    Ascending aorta 10/18/2022 2.74  cm Final    LV mass 10/18/2022 150.11  g Final    LA size 10/18/2022 3.03  cm Final    RVDD 10/18/2022 3.21  cm Final    TAPSE 10/18/2022 2.50  cm Final    Left Ventricle Relative Wall Thick* 10/18/2022 0.39  cm Final    AV regurgitation pressure 1/2 time 10/18/2022 [id number]  ms Final    AV mean gradient 10/18/2022 2  mmHg Final    AV valve area 10/18/2022 3.08  cm2 Final    AV Velocity Ratio 10/18/2022 0.94   Final    AV index (prosthetic) 10/18/2022 0.97   Final    MV mean gradient 10/18/2022 1  mmHg Final    MV valve area p 1/2 method 10/18/2022 4.01  cm2 Final    MV valve area by continuity eq 10/18/2022 2.27  cm2 Final    E/A ratio 10/18/2022 1.16   Final    Mean e' 10/18/2022 0.09  m/s Final    E wave deceleration time 10/18/2022 189.31  msec Final    IVRT 10/18/2022 73.82  msec Final    Pulm  Trunk sway increased  Distance (ft): 6 Feet (ft)  Assistive Device: (none)  Ambulation - Level of Assistance: Contact guard assistance;Minimal assistance      Body Structures Involved:  1. Lungs  2. Muscles Body Functions Affected:  1. Respiratory  2. Neuromusculoskeletal  3. Movement Related Activities and Participation Affected:  1. General Tasks and Demands  2. Mobility   Number of elements that affect the Plan of Care: 3: MODERATE COMPLEXITY   CLINICAL PRESENTATION:   Presentation: Stable and uncomplicated: LOW COMPLEXITY   CLINICAL DECISION MAKIN[address] [place] [address] AM-PAC 6 Clicks   Basic Mobility Inpatient Short Form  How much difficulty does the patient currently have. .. Unable A Lot A Little None   1. Turning over in bed (including adjusting bedclothes, sheets and blankets)? ? 1   ? 2   ? 3   ? 4   2. Sitting down on and standing up from a chair with arms ( e.g., wheelchair, bedside commode, etc.)   ? 1   ? 2   ? 3   ? 4   3. Moving from lying on back to sitting on the side of the bed?   ? 1   ? 2   ? 3   ? 4   How much help from another person does the patient currently need. .. Total A Lot A Little None   4. Moving to and from a bed to a chair (including a wheelchair)? ? 1   ? 2   ? 3   ? 4   5. Need to walk in hospital room? ? 1   ? 2   ? 3   ? 4   6. Climbing 3-5 steps with a railing? ? 1   ? 2   ? 3   ? 4   © , Trustees of [address] [place] [address], under license to [employer]. All rights reserved      Score:  Initial: 20 Most Recent: X (Date: -- )    Interpretation of Tool:  Represents activities that are increasingly more difficult (i.e. Bed mobility, Transfers, Gait). Medical Necessity:     · Patient is expected to demonstrate progress in strength, balance, coordination, functional technique and activity tolerance   ·  to improve safety during all mobility.    · .  Reason for Services/Other Comments:  · Patient continues to require skilled intervention due to medical complications vein S/D ratio 10/18/2022 0.96   Final    LVOT diameter 10/18/2022 2.01  cm Final    LVOT area 10/18/2022 3.2  cm2 Final    LVOT peak brown 10/18/2022 0.78  m/s Final    LVOT peak VTI 10/18/2022 20.40  cm Final    Ao peak brwon 10/18/2022 0.83  m/s Final    Ao VTI 10/18/2022 21.0  cm Final    Mr max brown 10/18/2022 3.18  m/s Final    LVOT stroke volume 10/18/2022 64.70  cm3 Final    AV peak gradient 10/18/2022 3  mmHg Final    MV peak gradient 10/18/2022 3  mmHg Final    TV rest pulmonary artery pressure 10/18/2022 28  mmHg Final    E/E' ratio 10/18/2022 10.47  m/s Final    MV Peak E Brown 10/18/2022 0.89  m/s Final    AR Max Brown 10/18/2022 2.83  m/s Final    TR Max Brown 10/18/2022 2.51  m/s Final    MV VTI 10/18/2022 28.5  cm Final    MV stenosis pressure 1/2 time 10/18/2022 54.90  ms Final    MV Peak A Brown 10/18/2022 0.77  m/s Final    PV Peak S Brown 10/18/2022 0.53  m/s Final    PV Peak D Brown 10/18/2022 0.55  m/s Final    LV Systolic Volume 10/18/2022 48.23  mL Final    LV Systolic Volume Index 10/18/2022 23.1  mL/m2 Final    LV Diastolic Volume 10/18/2022 104.17  mL Final    LV Diastolic Volume Index 10/18/2022 49.84  mL/m2 Final    LA Volume Index 10/18/2022 22.1  mL/m2 Final    LV Mass Index 10/18/2022 72  g/m2 Final    RA Major [place] 10/18/2022 4.02  cm Final    Left Atrium Minor Axis 10/18/2022 5.44  cm Final    Left Atrium Major Axis 10/18/2022 4.16  cm Final    Triscuspid Valve Regurgitation Pea* 10/18/2022 25  mmHg Final    LA Volume Index (Mod) 10/18/2022 27.8  mL/m2 Final    LA volume (mod) 10/18/2022 58.00  cm3 Final    RA Width 10/18/2022 3.00  cm Final    EF 10/18/2022 55  % Final       Imaging  No results found.    Assessment  1. Acute on chronic diastolic heart failure  Compensated after a few doses of oral diuretics.  Not requiring diuretics currently.  Reinforced the importance of sodium restriction to the holidays.    2. Atherosclerosis of native coronary artery of native heart without angina  and mobility deficits which impact his level of function, safety, and independence as indicated above. · .   Use of outcome tool(s) and clinical judgement create a POC that gives a: Clear prediction of patient's progress: LOW COMPLEXITY        TREATMENT:   (In addition to Assessment/Re-Assessment sessions the following treatments were rendered)   Pre-treatment Symptoms/Complaints:  'thank you\"  Pain: Initial:   Pain Intensity 1: 0  Post Session:  0/10     Therapeutic Activity: (  20 Minutes): Therapeutic activities including Bed mobility, Chair transfers, and Ambulation on level ground while monitoring O2 sats to improve mobility, strength and balance. Required minimal   verbal and safety cues to promote static and dynamic balance in standing and promote coordination of bilateral, lower extremity(s). Date:  7/15/19 Date:   Date:     Activity/Exercise Parameters Parameters Parameters   Seated Ankle Pumps 1 x 20 BLE     Seated LAQ 1 x 10 BLE     Seated Marches 1 x 10 BLE     Sit to Stands 2 x 5 repetitions                         Braces/Orthotics/Lines/Etc:   · O2 Device: Nasal cannula  Treatment/Session Assessment:    · Response to Treatment:  No real progress, pt without much interest in therapy  · Interdisciplinary Collaboration:   o Physical Therapist  o Registered Nurse  · After treatment position/precautions:   o Up in chair  o Bed/Chair-wheels locked  o Bed in low position  o Call light within reach  o RN notified  o RT in room, family present   · Compliance with Program/Exercises: Will assess as treatment progresses  · Recommendations/Intent for next treatment session: \"Next visit will focus on advancements to more challenging activities and reduction in assistance provided\".     Total Treatment Duration:  PT Patient Time In/Time Out  Time In: 0855  Time Out: [facility] pectoris  Stable    3. Primary hypertension  Controlled    4. Hypercholesteremia  Controlled      Plan and Discussion    Continue current guideline directed medical therapy.    The 10-year ASCVD risk score (Taylor DK, et al., 2019) is: 32.6%    Values used to calculate the score:      Age: 77 years      Sex: Male      Is Non- : No      Diabetic: No      Tobacco smoker: No      Systolic Blood Pressure: 130 mmHg      Is BP treated: Yes      HDL Cholesterol: 59 mg/dL      Total Cholesterol: 133 mg/dL     Follow Up  Follow up in about 6 months (around 6/20/2023).      Adolph Barboza MD

## 2023-06-12 ENCOUNTER — TELEPHONE (OUTPATIENT)
Dept: CARDIOLOGY | Facility: CLINIC | Age: 78
End: 2023-06-12
Payer: MEDICARE

## 2023-06-12 NOTE — TELEPHONE ENCOUNTER
Spoke to pt. Answered all questions. Confirmed upcoming appointment with Dr. Barboza. Pt verbalized understanding.

## 2023-06-12 NOTE — TELEPHONE ENCOUNTER
----- Message from Henny Mcfadden sent at 6/12/2023 11:02 AM CDT -----  Name of Who is Calling:CHAYO CALABRESE [[id number]]              What is the request in detail:Requesting a call back regarding stint procedures               Can the clinic reply by [facility]:              [facility][phone]

## 2023-06-14 NOTE — Clinical Note
NEW PATIENT  DX- LOWER BACK PAIN  MRI --YES OPEN MRI   PT--YES DO NOT REMEMBER WHERE  INJ--YES DO NOT REMEMBER WHERE  SX--NO    PATIENT IS SCHEDULE FOR 8/09/23 BUT WOULD LIKE TO BE SEEN SOONER IF POSSIBLE HE IS IN PAIN.     The site was marked. The groin and right radial was prepped. The site was prepped with ChloraPrep. The site was clipped. The patient was draped. The patient was positioned supine.

## 2023-06-20 ENCOUNTER — OFFICE VISIT (OUTPATIENT)
Dept: CARDIOLOGY | Facility: CLINIC | Age: 78
End: 2023-06-20
Payer: MEDICARE

## 2023-06-20 VITALS
OXYGEN SATURATION: 98 % | SYSTOLIC BLOOD PRESSURE: 124 MMHG | WEIGHT: 177.69 LBS | DIASTOLIC BLOOD PRESSURE: 70 MMHG | HEIGHT: 73 IN | HEART RATE: 57 BPM | BODY MASS INDEX: 23.55 KG/M2

## 2023-06-20 DIAGNOSIS — I25.10 ATHEROSCLEROSIS OF NATIVE CORONARY ARTERY OF NATIVE HEART WITHOUT ANGINA PECTORIS: Primary | ICD-10-CM

## 2023-06-20 DIAGNOSIS — I50.32 CHRONIC DIASTOLIC HEART FAILURE: ICD-10-CM

## 2023-06-20 DIAGNOSIS — I10 PRIMARY HYPERTENSION: ICD-10-CM

## 2023-06-20 DIAGNOSIS — E78.00 HYPERCHOLESTEREMIA: ICD-10-CM

## 2023-06-20 PROCEDURE — 99213 OFFICE O/P EST LOW 20 MIN: CPT | Mod: PBBFAC | Performed by: INTERNAL MEDICINE

## 2023-06-20 PROCEDURE — 99999 PR PBB SHADOW E&M-EST. PATIENT-LVL III: ICD-10-PCS | Mod: PBBFAC,,, | Performed by: INTERNAL MEDICINE

## 2023-06-20 PROCEDURE — 99214 PR OFFICE/OUTPT VISIT, EST, LEVL IV, 30-39 MIN: ICD-10-PCS | Mod: S$PBB,,, | Performed by: INTERNAL MEDICINE

## 2023-06-20 PROCEDURE — 99999 PR PBB SHADOW E&M-EST. PATIENT-LVL III: CPT | Mod: PBBFAC,,, | Performed by: INTERNAL MEDICINE

## 2023-06-20 PROCEDURE — 99214 OFFICE O/P EST MOD 30 MIN: CPT | Mod: S$PBB,,, | Performed by: INTERNAL MEDICINE

## 2023-06-20 NOTE — PROGRESS NOTES
[facility]    Chief Complaint  No chief complaint on file.      HPI:    No problems reported.  Feeling well.  No exertional chest pain or dyspnea.    Medications  Current Outpatient Medications   Medication Sig Dispense Refill    aspirin 81 mg Cap Take 81 mg by mouth once daily.      atenoloL (TENORMIN) 25 MG tablet TAKE ONE TABLET BY MOUTH EVERY DAY 90 tablet 3    atorvastatin (LIPITOR) 20 MG tablet Take 1 tablet (20 mg total) by mouth every evening. 90 tablet 3    clopidogreL (PLAVIX) 75 mg tablet Take 1 tablet (75 mg total) by mouth once daily. 90 tablet 3    diazepam (VALIUM ORAL) Take by mouth daily as needed.      furosemide (LASIX) 40 MG tablet Take 1 tablet (40 mg total) by mouth once daily. 30 tablet 0    lisinopriL (PRINIVIL,ZESTRIL) 20 MG tablet Take 20 mg by mouth once daily.      meclizine (ANTIVERT) 25 mg tablet Take 25 mg by mouth daily as needed.      montelukast (SINGULAIR) 10 mg tablet Take 10 mg by mouth daily as needed.      tamsulosin (FLOMAX) 0.4 mg Cap Take 0.4 mg by mouth once daily.      cyclobenzaprine (FLEXERIL) 10 MG tablet Take 10 mg by mouth nightly as needed.      donepeziL (ARICEPT) 5 MG tablet Take 5 mg by mouth every evening.       No current facility-administered medications for this visit.        History  Past Medical History:   Diagnosis Date    Coronary atherosclerosis     Diastolic heart failure     Essential hypertension     Hypercholesteremia     Labyrinthitis     Stroke 2011    diplopia. Neuro-ophtho dx'd possible microstroke    Vertigo      Past Surgical History:   Procedure Laterality Date    CORONARY ANGIOGRAPHY Right 10/26/2022    Procedure: ANGIOGRAM, CORONARY ARTERY;  Surgeon: Adolph Barboza MD;  Location: [facility] CATH LAB;  Service: Cardiology;  Laterality: Right;  right radial    CORONARY STENT PLACEMENT  12/18/2000    prox LAD Tetra 3.5 x 18 mm, mid LAD Tetra 3 x 18 mm    CORONARY STENT PLACEMENT  11/22/2004    prox LCx Cypher 2.5 x 13 mm, OM POBA 2 mm      Social History     Socioeconomic History    Marital status:    Tobacco Use    Smoking status: Former     Types: Cigars    Smokeless tobacco: Never   Substance and Sexual Activity    Alcohol use: Yes     Family History   Problem Relation Age of Onset    Heart disease Neg Hx     Hypertension Neg Hx         Allergies  Review of patient's allergies indicates:  No Known Allergies    Review of Systems   Review of Systems   Constitutional: Negative for malaise/fatigue, weight gain and weight loss.   Eyes:  Negative for visual disturbance.   Cardiovascular:  Negative for chest pain, claudication, cyanosis, dyspnea on exertion, irregular heartbeat, leg swelling, near-syncope, orthopnea, palpitations, paroxysmal nocturnal dyspnea and syncope.   Respiratory:  Negative for cough, hemoptysis, shortness of breath, sleep disturbances due to breathing and wheezing.    Hematologic/Lymphatic: Negative for bleeding problem. Does not bruise/bleed easily.   Skin:  Negative for poor wound healing.   Musculoskeletal:  Negative for falls, muscle cramps and myalgias.   Gastrointestinal:  Negative for abdominal pain, anorexia, diarrhea, heartburn, hematemesis, hematochezia, melena, nausea and vomiting.   Genitourinary:  Negative for hematuria and nocturia.   Neurological:  Negative for excessive daytime sleepiness, dizziness, focal weakness, light-headedness and weakness.     Physical Exam  Vitals:    06/20/23 1040   BP: 124/70   Pulse: (!) 57     Wt Readings from Last 1 Encounters:   06/20/23 80.6 kg (177 lb 11.2 oz)     Physical Exam  Vitals and nursing note reviewed.   Constitutional:       General: He is not in acute distress.     Appearance: He is not toxic-appearing or diaphoretic.   HENT:      Head: Normocephalic and atraumatic.      Mouth/Throat:      Lips: Pink.      Mouth: Mucous membranes are moist.   Eyes:      General: No scleral icterus.     Conjunctiva/sclera: Conjunctivae normal.   Neck:      Thyroid: No  thyromegaly.      Vascular: No carotid bruit, hepatojugular reflux or JVD.      Trachea: Trachea normal.   Cardiovascular:      Rate and Rhythm: Normal rate and regular rhythm. No extrasystoles are present.     Chest Wall: PMI is not displaced.      Pulses:           Carotid pulses are 2+ on the right side and 2+ on the left side.       Radial pulses are 2+ on the right side and 2+ on the left side.        Dorsalis pedis pulses are 2+ on the right side and 2+ on the left side.        Posterior tibial pulses are 2+ on the right side and 2+ on the left side.      Heart sounds: S1 normal and S2 normal. No murmur heard.    No friction rub. No S3 or S4 sounds.   Pulmonary:      Effort: Pulmonary effort is normal. No tachypnea, bradypnea, accessory muscle usage or respiratory distress.      Breath sounds: Normal breath sounds and air entry. No decreased breath sounds, wheezing, rhonchi or rales.   Abdominal:      General: Bowel sounds are normal. There is no distension or abdominal bruit.      Palpations: Abdomen is soft. There is no hepatomegaly, splenomegaly or pulsatile mass.      Tenderness: There is no abdominal tenderness.   Musculoskeletal:         General: No tenderness or deformity.      Right lower le+ Edema present.      Left lower le+ Edema present.   Skin:     General: Skin is warm and dry.      Capillary Refill: Capillary refill takes less than 2 seconds.      Coloration: Skin is not cyanotic or pale.      Nails: There is no clubbing.   Neurological:      General: No focal deficit present.      Mental Status: He is alert and oriented to person, place, and time.   Psychiatric:         Attention and Perception: Attention normal.         Mood and Affect: Mood normal.         Speech: Speech normal.         Behavior: Behavior normal. Behavior is cooperative.       Labs  No visits with results within 6 Month(s) from this visit.   Latest known visit with results is:   Admission on 10/26/2022, Discharged on  10/26/2022   Component Date Value Ref Range Status    POC ACTIVATED CLOTTING TIME K 10/26/2022 283 (H)  74 - 137 sec Final    Sample 10/26/2022 unknown   Final       Imaging  No results found.    Assessment  1. Chronic diastolic heart failure  Compensated    2. Atherosclerosis of native coronary artery of native heart without angina pectoris  Stable 8 months after circumflex intervention    3. Primary hypertension  Controlled    4. Hypercholesteremia  Due for reassessment in the Fall      Plan and Discussion    Continue current guideline directed medical therapy.  Discontinue Plavix at the end of October.    The ASCVD Risk score ([place] DK, et al., 2019) failed to calculate for the following reasons:    The 2019 ASCVD risk score is only valid for ages 40 to 79     Follow Up  Follow up in about 6 months (around 12/20/2023).      Adolph Barboza MD

## 2023-08-01 RX ORDER — CLOPIDOGREL BISULFATE 75 MG/1
75 TABLET ORAL
Qty: 90 TABLET | Refills: 0 | Status: SHIPPED | OUTPATIENT
Start: 2023-08-01 | End: 2023-12-12

## 2023-08-31 DIAGNOSIS — I50.33 ACUTE ON CHRONIC DIASTOLIC HEART FAILURE: Primary | ICD-10-CM

## 2023-08-31 RX ORDER — FUROSEMIDE 40 MG/1
40 TABLET ORAL DAILY PRN
Qty: 30 TABLET | Refills: 3 | Status: SHIPPED | OUTPATIENT
Start: 2023-08-31

## 2023-08-31 NOTE — TELEPHONE ENCOUNTER
----- Message from Gia Jin sent at 8/31/2023  9:55 AM CDT -----  Name of Who is Calling: CHAYO CALABRESE [[id number]]           What is the request in detail: Patient is requesting a call back.  Patient is experiencing some swelling in the lower legs and needs to know if he should resume taking furosemide (LASIX) 40 MG tablet.  Please assist.           Can the clinic reply by [facility]: No           [facility] [phone]

## 2023-08-31 NOTE — TELEPHONE ENCOUNTER
Pt states current bottle of lasix is from  when originally prescribed with 4 pills remaining. Recommended to dispose of  medication and will send in updated rx for Lasix 40mg to [facility]. He verbalized understanding.

## 2023-10-09 DIAGNOSIS — E78.00 HYPERCHOLESTEREMIA: ICD-10-CM

## 2023-10-09 RX ORDER — ATORVASTATIN CALCIUM 20 MG/1
20 TABLET, FILM COATED ORAL NIGHTLY
Qty: 90 TABLET | Refills: 3 | Status: SHIPPED | OUTPATIENT
Start: 2023-10-09

## 2023-11-01 ENCOUNTER — TELEPHONE (OUTPATIENT)
Dept: CARDIOLOGY | Facility: CLINIC | Age: 78
End: 2023-11-01
Payer: MEDICARE

## 2023-11-01 NOTE — TELEPHONE ENCOUNTER
Returned call to patient. Advised him that he only need to take the fluid pill if his legs are swollen, prn. And to watch his salt intake.

## 2023-11-01 NOTE — TELEPHONE ENCOUNTER
----- Message from Donna Guajardo sent at 11/1/2023 11:37 AM CDT -----   Name of Who is Calling:     What is the request in detail:  patient request call back in reference to medication   furosemide (LASIX) 40 MG tablet  concerns   /patient  state medication has him going to bathroom too often / patient want to know if can change to different medication that will not cause this side effect Please contact to further discuss and advise      Can the clinic reply by [facility]:     What Number to Call Back if not in [facility]:  [phone]

## 2023-12-12 ENCOUNTER — OFFICE VISIT (OUTPATIENT)
Dept: CARDIOLOGY | Facility: CLINIC | Age: 78
End: 2023-12-12
Attending: INTERNAL MEDICINE
Payer: MEDICARE

## 2023-12-12 VITALS
SYSTOLIC BLOOD PRESSURE: 126 MMHG | OXYGEN SATURATION: 99 % | BODY MASS INDEX: 22.16 KG/M2 | WEIGHT: 168 LBS | DIASTOLIC BLOOD PRESSURE: 78 MMHG | HEART RATE: 58 BPM

## 2023-12-12 DIAGNOSIS — E78.00 HYPERCHOLESTEREMIA: ICD-10-CM

## 2023-12-12 DIAGNOSIS — I10 PRIMARY HYPERTENSION: ICD-10-CM

## 2023-12-12 DIAGNOSIS — I25.10 ATHEROSCLEROSIS OF NATIVE CORONARY ARTERY OF NATIVE HEART WITHOUT ANGINA PECTORIS: ICD-10-CM

## 2023-12-12 DIAGNOSIS — I50.32 CHRONIC DIASTOLIC HEART FAILURE: Primary | ICD-10-CM

## 2023-12-12 PROCEDURE — 99214 OFFICE O/P EST MOD 30 MIN: CPT | Mod: S$PBB,,, | Performed by: INTERNAL MEDICINE

## 2023-12-12 PROCEDURE — 99999 PR PBB SHADOW E&M-EST. PATIENT-LVL III: ICD-10-PCS | Mod: PBBFAC,,, | Performed by: INTERNAL MEDICINE

## 2023-12-12 PROCEDURE — 99999 PR PBB SHADOW E&M-EST. PATIENT-LVL III: CPT | Mod: PBBFAC,,, | Performed by: INTERNAL MEDICINE

## 2023-12-12 PROCEDURE — 99214 PR OFFICE/OUTPT VISIT, EST, LEVL IV, 30-39 MIN: ICD-10-PCS | Mod: S$PBB,,, | Performed by: INTERNAL MEDICINE

## 2023-12-12 PROCEDURE — 99213 OFFICE O/P EST LOW 20 MIN: CPT | Mod: PBBFAC | Performed by: INTERNAL MEDICINE

## 2023-12-12 NOTE — PROGRESS NOTES
[facility]    Chief Complaint  Chief Complaint   Patient presents with    Coronary Artery Disease       HPI:    Has lost 10 lb.  Poor appetite.  He attributes that to stress.  Has had a few falls because of imbalance.  No loss of consciousness.  No head trauma.    Medications  Current Outpatient Medications   Medication Sig Dispense Refill    aspirin 81 mg Cap Take 81 mg by mouth once daily.      atenoloL (TENORMIN) 25 MG tablet TAKE ONE TABLET BY MOUTH EVERY DAY 90 tablet 3    atorvastatin (LIPITOR) 20 MG tablet TAKE ONE TABLET BY MOUTH EVERY EVENING 90 tablet 3    lisinopriL (PRINIVIL,ZESTRIL) 20 MG tablet Take 20 mg by mouth once daily.      cyclobenzaprine (FLEXERIL) 10 MG tablet Take 10 mg by mouth nightly as needed.      diazepam (VALIUM ORAL) Take by mouth daily as needed.      donepeziL (ARICEPT) 5 MG tablet Take 5 mg by mouth every evening.      furosemide (LASIX) 40 MG tablet Take 1 tablet (40 mg total) by mouth daily as needed (fluid). 30 tablet 3    meclizine (ANTIVERT) 25 mg tablet Take 25 mg by mouth daily as needed.      montelukast (SINGULAIR) 10 mg tablet Take 10 mg by mouth daily as needed.      tamsulosin (FLOMAX) 0.4 mg Cap Take 0.4 mg by mouth once daily.       No current facility-administered medications for this visit.        History  Past Medical History:   Diagnosis Date    Coronary atherosclerosis     Diastolic heart failure     Essential hypertension     Hypercholesteremia     Labyrinthitis     Stroke 2011    diplopia. Neuro-ophtho dx'd possible microstroke    Vertigo      Past Surgical History:   Procedure Laterality Date    CORONARY ANGIOGRAPHY Right 10/26/2022    Procedure: ANGIOGRAM, CORONARY ARTERY;  Surgeon: Adolph Barboza MD;  Location: [facility] CATH LAB;  Service: Cardiology;  Laterality: Right;  right radial    CORONARY STENT PLACEMENT  12/18/2000    prox LAD Tetra 3.5 x 18 mm, mid LAD Tetra 3 x 18 mm    CORONARY STENT PLACEMENT  11/22/2004    prox LCx Cypher 2.5 x 13  mm, OM POBA 2 mm     Social History     Socioeconomic History    Marital status:    Tobacco Use    Smoking status: Former     Types: Cigars    Smokeless tobacco: Never   Substance and Sexual Activity    Alcohol use: Yes     Family History   Problem Relation Age of Onset    Heart disease Neg Hx     Hypertension Neg Hx         Allergies  Review of patient's allergies indicates:  No Known Allergies    Review of Systems   Review of Systems   Constitutional: Positive for weight loss. Negative for malaise/fatigue and weight gain.   Eyes:  Negative for visual disturbance.   Cardiovascular:  Negative for chest pain, claudication, cyanosis, dyspnea on exertion, irregular heartbeat, leg swelling, near-syncope, orthopnea, palpitations, paroxysmal nocturnal dyspnea and syncope.   Respiratory:  Negative for cough, hemoptysis, shortness of breath, sleep disturbances due to breathing and wheezing.    Hematologic/Lymphatic: Negative for bleeding problem. Does not bruise/bleed easily.   Skin:  Negative for poor wound healing.   Musculoskeletal:  Negative for falls, muscle cramps and myalgias.   Gastrointestinal:  Negative for abdominal pain, anorexia, diarrhea, heartburn, hematemesis, hematochezia, melena, nausea and vomiting.   Genitourinary:  Negative for hematuria and nocturia.   Neurological:  Negative for excessive daytime sleepiness, dizziness, focal weakness, light-headedness and weakness.       Physical Exam  Vitals:    12/12/23 1126   BP: 126/78   Pulse: (!) 58     Wt Readings from Last 1 Encounters:   12/12/23 76.2 kg (168 lb)     Physical Exam  Vitals and nursing note reviewed.   Constitutional:       General: He is not in acute distress.     Appearance: He is not toxic-appearing or diaphoretic.   HENT:      Head: Normocephalic and atraumatic.      Mouth/Throat:      Lips: Pink.      Mouth: Mucous membranes are moist.   Eyes:      General: No scleral icterus.     Conjunctiva/sclera: Conjunctivae normal.   Neck:       Thyroid: No thyromegaly.      Vascular: No carotid bruit, hepatojugular reflux or JVD.      Trachea: Trachea normal.   Cardiovascular:      Rate and Rhythm: Normal rate and regular rhythm. No extrasystoles are present.     Chest Wall: PMI is not displaced.      Pulses:           Carotid pulses are 2+ on the right side and 2+ on the left side.       Radial pulses are 2+ on the right side and 2+ on the left side.        Dorsalis pedis pulses are 2+ on the right side and 2+ on the left side.        Posterior tibial pulses are 2+ on the right side and 2+ on the left side.      Heart sounds: S1 normal and S2 normal. No murmur heard.     No friction rub. No S3 or S4 sounds.   Pulmonary:      Effort: Pulmonary effort is normal. No tachypnea, bradypnea, accessory muscle usage or respiratory distress.      Breath sounds: Normal breath sounds and air entry. No decreased breath sounds, wheezing, rhonchi or rales.   Abdominal:      General: Bowel sounds are normal. There is no distension or abdominal bruit.      Palpations: Abdomen is soft. There is no hepatomegaly, splenomegaly or pulsatile mass.      Tenderness: There is no abdominal tenderness.   Musculoskeletal:         General: No tenderness or deformity.      Right lower le+ Edema present.      Left lower le+ Edema present.   Skin:     General: Skin is warm and dry.      Capillary Refill: Capillary refill takes less than 2 seconds.      Coloration: Skin is not cyanotic or pale.      Nails: There is no clubbing.   Neurological:      General: No focal deficit present.      Mental Status: He is alert and oriented to person, place, and time.   Psychiatric:         Attention and Perception: Attention normal.         Mood and Affect: Mood normal.         Speech: Speech normal.         Behavior: Behavior normal. Behavior is cooperative.         Labs  No visits with results within 6 Month(s) from this visit.   Latest known visit with results is:   Admission on  10/26/2022, Discharged on 10/26/2022   Component Date Value Ref Range Status    POC ACTIVATED CLOTTING TIME K 10/26/2022 283 (H)  74 - 137 sec Final    Sample 10/26/2022 unknown   Final       Imaging  XR PELVIS 3+ VIEWS    Result Date: 11/17/2023  Clinical data: Back pain. PELVIS AND SI JOINTS FINDINGS: 4 views of the pelvic bones and SI joints were obtained. There is diffuse osteopenia. There are degenerative changes at bilateral SI joints. No displaced fracture or dislocation. There are moderately severe degenerative changes involving bilateral hips. Bulky left lateral osteophytosis is seen at L3-4 and L4-5. No acute osseous abnormalities.    Degenerative changes at bilateral SI joints. No acute osseous abnormalities. Electronically Signed By: Jessenia Avila MD 11/17/2023 12:45 PM CST    X-Ray Lumbar Spine 2 Or 3 Views    Result Date: 11/17/2023  Clinical data:   Back pain. LUMBAR SPINE:   3 views of the lumbar spine are obtained.  The alignment and vertebral body heights are maintained. There is moderate multi-level degenerative disc space narrowing, and moderate anterior and lateral spurring. There is moderately severe facet arthropathy from L2-3 through L5-S1. There is no evidence of fracture or subluxation.  No other bony abnormalities are demonstrated.    Moderately severe multilevel degenerative disc disease and facet arthropathy. No acute osseous abnormality. Electronically Signed By: Jessenia Avila MD 11/17/2023 12:32 PM CST      Assessment  1. Chronic diastolic heart failure  Compensated    2. Atherosclerosis of native coronary artery of native heart without angina pectoris  Stable.  Now 1 year out from his last intervention.  Stop clopidogrel    3. Primary hypertension  Controlled    4. Hypercholesteremia  Controlled      Plan and Discussion    No change to current guideline directed medical therapy.    The ASCVD Risk score ([place] DK, et al., 2019) failed to calculate for the following reasons:    The  2019 ASCVD risk score is only valid for ages 40 to 79     Follow Up  Follow up in about 6 months (around 6/12/2024).      Adolph Barboza MD

## 2024-01-11 ENCOUNTER — TELEPHONE (OUTPATIENT)
Dept: CARDIOLOGY | Facility: CLINIC | Age: 79
End: 2024-01-11
Payer: MEDICARE

## 2024-01-11 NOTE — TELEPHONE ENCOUNTER
----- Message from Donna Guajardo sent at 1/11/2024 11:11 AM CST -----   Name of Who is Calling:     What is the request in detail:   patient request call back in reference to medication    furosemide (LASIX) 40 MG tablet   / patient want know if can change  medication due to constant going to bathroom   Please contact to further discuss and advise      Can the clinic reply by [facility]:     What Number to Call Back if not in [facility]:   [phone]

## 2024-02-05 RX ORDER — ATENOLOL 25 MG/1
TABLET ORAL
Qty: 90 TABLET | Refills: 3 | Status: SHIPPED | OUTPATIENT
Start: 2024-02-05

## 2024-07-23 ENCOUNTER — OFFICE VISIT (OUTPATIENT)
Dept: CARDIOLOGY | Facility: CLINIC | Age: 79
End: 2024-07-23
Payer: MEDICARE

## 2024-07-23 DIAGNOSIS — I10 PRIMARY HYPERTENSION: ICD-10-CM

## 2024-07-23 DIAGNOSIS — I50.33 ACUTE ON CHRONIC DIASTOLIC HEART FAILURE: ICD-10-CM

## 2024-07-23 DIAGNOSIS — I25.10 ATHEROSCLEROSIS OF NATIVE CORONARY ARTERY OF NATIVE HEART WITHOUT ANGINA PECTORIS: Primary | ICD-10-CM

## 2024-07-23 DIAGNOSIS — E78.00 HYPERCHOLESTEREMIA: ICD-10-CM

## 2024-07-23 PROBLEM — I50.30 DIASTOLIC HEART FAILURE: Status: RESOLVED | Noted: 2022-05-17 | Resolved: 2024-07-23

## 2024-07-23 PROCEDURE — 99999 PR PBB SHADOW E&M-EST. PATIENT-LVL III: CPT | Mod: PBBFAC,,, | Performed by: INTERNAL MEDICINE

## 2024-07-23 PROCEDURE — 93005 ELECTROCARDIOGRAM TRACING: CPT

## 2024-07-23 PROCEDURE — 99213 OFFICE O/P EST LOW 20 MIN: CPT | Mod: PBBFAC | Performed by: INTERNAL MEDICINE

## 2024-07-23 RX ORDER — FUROSEMIDE 40 MG/1
40 TABLET ORAL DAILY PRN
Qty: 30 TABLET | Refills: 3 | Status: SHIPPED | OUTPATIENT
Start: 2024-07-23

## 2024-07-23 NOTE — PROGRESS NOTES
[facility]    Chief Complaint  Chief Complaint   Patient presents with    Coronary Artery Disease       HPI:    Here for routine follow-up.  Only concerns are his worsening memory.  Says he is continuing to walk every afternoon for exercise.  No problems with exertional dyspnea or chest discomfort.  Has noted more swelling of his lower extremities.  But reluctant to take Lasix because of frequent urination.  No dyspnea.  No paroxysmal nocturnal dyspnea or orthopnea.    Medications  Current Outpatient Medications   Medication Sig Dispense Refill    aspirin 81 mg Cap Take 81 mg by mouth once daily.      atenoloL (TENORMIN) 25 MG tablet TAKE ONE TABLET BY MOUTH EVERY DAY 90 tablet 3    atorvastatin (LIPITOR) 20 MG tablet TAKE ONE TABLET BY MOUTH EVERY EVENING 90 tablet 3    lisinopriL (PRINIVIL,ZESTRIL) 20 MG tablet Take 20 mg by mouth once daily.      meclizine (ANTIVERT) 25 mg tablet Take 25 mg by mouth daily as needed.      montelukast (SINGULAIR) 10 mg tablet Take 10 mg by mouth daily as needed.      tamsulosin (FLOMAX) 0.4 mg Cap Take 0.4 mg by mouth once daily.      cyclobenzaprine (FLEXERIL) 10 MG tablet Take 10 mg by mouth nightly as needed. (Patient not taking: Reported on 7/23/2024)      diazepam (VALIUM ORAL) Take by mouth daily as needed. (Patient not taking: Reported on 7/23/2024)      donepeziL (ARICEPT) 5 MG tablet Take 5 mg by mouth every evening. (Patient not taking: Reported on 7/23/2024)      furosemide (LASIX) 40 MG tablet Take 1 tablet (40 mg total) by mouth daily as needed (fluid). 30 tablet 3     No current facility-administered medications for this visit.        History  Past Medical History:   Diagnosis Date    Coronary atherosclerosis     Diastolic heart failure     Essential hypertension     Hypercholesteremia     Labyrinthitis     Stroke 2011    diplopia. Neuro-ophtho dx'd possible microstroke    Vertigo      Past Surgical History:   Procedure Laterality  Date    CORONARY ANGIOGRAPHY Right 10/26/2022    Procedure: ANGIOGRAM, CORONARY ARTERY;  Surgeon: Adolph Barboza MD;  Location: [facility] CATH LAB;  Service: Cardiology;  Laterality: Right;  right radial    CORONARY STENT PLACEMENT  12/18/2000    prox LAD Tetra 3.5 x 18 mm, mid LAD Tetra 3 x 18 mm    CORONARY STENT PLACEMENT  11/22/2004    prox LCx Cypher 2.5 x 13 mm, OM POBA 2 mm     Social History     Socioeconomic History    Marital status:    Tobacco Use    Smoking status: Former     Types: Cigars    Smokeless tobacco: Never   Substance and Sexual Activity    Alcohol use: Yes     Family History   Problem Relation Name Age of Onset    Heart disease Neg Hx      Hypertension Neg Hx          Allergies  Review of patient's allergies indicates:  No Known Allergies    Review of Systems   Review of Systems   Constitutional: Negative for malaise/fatigue, weight gain and weight loss.   Eyes:  Negative for visual disturbance.   Cardiovascular:  Positive for leg swelling. Negative for chest pain, claudication, cyanosis, dyspnea on exertion, irregular heartbeat, near-syncope, orthopnea, palpitations, paroxysmal nocturnal dyspnea and syncope.   Respiratory:  Negative for cough, hemoptysis, shortness of breath, sleep disturbances due to breathing and wheezing.    Hematologic/Lymphatic: Negative for bleeding problem. Does not bruise/bleed easily.   Skin:  Negative for poor wound healing.   Musculoskeletal:  Negative for falls, muscle cramps and myalgias.   Gastrointestinal:  Negative for abdominal pain, anorexia, diarrhea, heartburn, hematemesis, hematochezia, melena, nausea and vomiting.   Genitourinary:  Negative for hematuria and nocturia.   Neurological:  Negative for excessive daytime sleepiness, dizziness, focal weakness, light-headedness and weakness.       Physical Exam  Vitals:    07/23/24 1020   BP: (!) (P) 140/78   Pulse: (!) (P) 50     Wt Readings from Last 1 Encounters:   07/23/24 (P) 77.1 kg (170 lb)      Physical Exam  Vitals and nursing note reviewed.   Constitutional:       General: He is not in acute distress.     Appearance: He is not toxic-appearing or diaphoretic.   HENT:      Head: Normocephalic and atraumatic.      Mouth/Throat:      Lips: Pink.      Mouth: Mucous membranes are moist.   Eyes:      General: No scleral icterus.     Conjunctiva/sclera: Conjunctivae normal.   Neck:      Thyroid: No thyromegaly.      Vascular: No carotid bruit, hepatojugular reflux or JVD.      Trachea: Trachea normal.   Cardiovascular:      Rate and Rhythm: Normal rate and regular rhythm. No extrasystoles are present.     Chest Wall: PMI is not displaced.      Pulses:           Carotid pulses are 2+ on the right side and 2+ on the left side.       Radial pulses are 2+ on the right side and 2+ on the left side.        Dorsalis pedis pulses are 2+ on the right side and 2+ on the left side.        Posterior tibial pulses are 2+ on the right side and 2+ on the left side.      Heart sounds: S1 normal and S2 normal. No murmur heard.     No friction rub. No S3 or S4 sounds.   Pulmonary:      Effort: Pulmonary effort is normal. No tachypnea, bradypnea, accessory muscle usage or respiratory distress.      Breath sounds: Normal breath sounds and air entry. No decreased breath sounds, wheezing, rhonchi or rales.   Abdominal:      General: Bowel sounds are normal. There is no distension or abdominal bruit.      Palpations: Abdomen is soft. There is no hepatomegaly, splenomegaly or pulsatile mass.      Tenderness: There is no abdominal tenderness.   Musculoskeletal:         General: No tenderness or deformity.      Right lower le+ Edema present.      Left lower le+ Edema present.   Skin:     General: Skin is warm and dry.      Capillary Refill: Capillary refill takes less than 2 seconds.      Coloration: Skin is not cyanotic or pale.      Nails: There is no clubbing.   Neurological:      General: No focal deficit present.       Mental Status: He is alert and oriented to person, place, and time.   Psychiatric:         Attention and Perception: Attention normal.         Mood and Affect: Mood normal.         Speech: Speech normal.         Behavior: Behavior normal. Behavior is cooperative.       Labs  No visits with results within 6 Month(s) from this visit.   Latest known visit with results is:   Admission on 10/26/2022, Discharged on 10/26/2022   Component Date Value Ref Range Status    POC ACTIVATED CLOTTING TIME K 10/26/2022 283 (H)  74 - 137 sec Final    Sample 10/26/2022 unknown   Final       Imaging  MRI Brain W WO Contrast    Result Date: 7/17/2024  EXAM DESCRIPTION: [facility] [place] BRAIN W WO CONTRAST DATE OF SERVICE: 7/17/2024 1:11 PM CDT CLINICAL HISTORY: 79 years - old Male with Mental status change, unknown cause. TECHNIQUE: Multisequence multiplanar MR images of the brain were obtained before and after the use of IV contrast. COMPARISON: None. FINDINGS: Diffusion weighted images demonstrate no diffusion restriction. Moderate bilateral subcortical and periventricular high T2/FLAIR signal intensities related to chronic microvascular ischemic disease. A few scattered low susceptibility regions of microhemorrhages. Generalized cerebral and cerebellar volume loss and mild ventriculomegaly. No intra axial or extra cerebral fluid collections are visualized. Paranasal sinuses are well pneumatized. Patchy fluid signal intensity within the right mastoid air cells. Orbits are symmetric. Adequate vascular flow voids are identified. 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    1.   Moderate chronic microvascular ischemic disease. 2.   Ventriculomegaly mildly out of proportion with the cerebral atrophy. Correlate with clinical symptomatology. 3.   Patchy fluid signal intensity within the right mastoid air cells, which could represent effusion or inflammatory changes of indeterminate age. Correlate clinically. This study was  reviewed with John Cade, Radiology Resident by  Dennis Hill MD, Radiology Staff. I have reviewed the images and agree with the findings in this report. Preliminary Report Dictated By: John Cade Electronically Signed By: Dennis Hill MD 7/17/2024 3:28 PM CDT      Assessment  1. Acute on chronic diastolic heart failure  Mildly volume overloaded    2. Atherosclerosis of native coronary artery of native heart without angina pectoris  Stable    3. Primary hypertension  Controlled    4. Hypercholesteremia  Controlled      Plan and Discussion    No change to present management.  Encouraged to take Lasix when he has evidence of volume overload.    The ASCVD Risk score (Taylor DK, et al., 2019) failed to calculate for the following reasons:    The 2019 ASCVD risk score is only valid for ages 40 to 79    The patient has a prior MI or stroke diagnosis     Follow Up  Follow up in about 6 months (around 1/23/2025).      Adolph Barboza MD

## 2024-07-24 LAB
OHS QRS DURATION: 130 MS
OHS QTC CALCULATION: 419 MS

## 2024-10-14 ENCOUNTER — TELEPHONE (OUTPATIENT)
Dept: NEUROLOGY | Facility: CLINIC | Age: 79
End: 2024-10-14
Payer: MEDICARE

## 2024-10-14 NOTE — TELEPHONE ENCOUNTER
Patient called Central Scheduling and rescheduled 12/11/2023 appointment to 12/31/2024 with Dr. Saad Beckman.    Called and spoke with patient and asked if patient still wanted to see Dr. Arguello or keep appointment with Dr. Beckman.    Patient wants to reschedule with Dr. Arguello but make appointment to the afternoon so spouse can come with patient to appointment.    Assisted with rescheduling patient and will be scheduled to 1/29/2025 at 2:15pm.

## 2024-10-14 NOTE — TELEPHONE ENCOUNTER
Spoke with patient to coordinate scheduling appointments with [facility].    Educated patient about clinic operations and informed that patient will see a NP for initial/pre-visit then see MD afterwards.    Patient request to opt out of vanguard as patient is not tech efficient.     Patient is scheduled:  In-Person with Dr. Arguello on 12/11/2024

## 2024-10-14 NOTE — TELEPHONE ENCOUNTER
Nuno Arguello MD VoConrad  I'm willing to see but patient is likely not a candidate for lecanemab given age/score and radiology read of MRI brain    1.   Moderate chronic microvascular ischemic disease.  2.   Ventriculomegaly mildly out of proportion with the cerebral atrophy. Correlate with clinical symptomatology.          Previous Messages       ----- Message -----  From: Conrad Dorman  Sent: 10/9/2024   8:42 AM CDT  To: Nuno Arguello MD  Subject: RE: schedule                                    Called & spoke with referring provider's MA  Verbally told that MOCA was 19/30 - unable to find actual MOCA in chart but will dig more  Provided fax number to send documentation/office note that states MOCA score  ----- Message -----  From: Nuno Arguello MD  Sent: 10/8/2024   4:01 PM CDT  To: Conrad Dorman  Subject: RE: schedule                                    >78 needs a cog screener  ----- Message -----  From: Conrad Dorman  Sent: 10/8/2024   2:33 PM CDT  To: Nuno Arguello MD  Subject: FW: schedule                                    Following up on this referral  Okay to see?  Patient got all labs but cognitive screener    Referred by [facility]  ----- Message -----  From: Conrad Dorman  Sent: 10/4/2024   8:31 AM CDT  To: Nuno Arguello MD  Subject: RE: schedule                                    [facility] referral from [facility]  ----- Message -----  From: Nuno Arguello MD  Sent: 10/4/2024   8:30 AM CDT  To: Conrad Dorman  Subject: RE: schedule                                    >78 - Needs a cognitive screener before scheduling  Where is this pt coming from that they got all there labs but no cognitive screener?  Who is the referring provider?  ----- Message -----  From: Conrad Dorman  Sent: 10/3/2024   8:11 PM CDT  To: Nuno Arguello MD  Subject: RE: schedule                                    [facility] referral from Dr. Jeremy Gutiérrez    Patient with MCI clinically  consistent with alzheimer's disease, family interested in further work up for infusion therapy    ABETA 42/40 = 0.126  APOE = e3/e4  Both labs done at [facility]    MRI Brain - 7/17/2024 - done at [facility] MRI dept  (This study was reviewed with Radiology Resident by MD, Radiology Staff.)    EKG - 7/23/2024    No Van Horne/neuropsych found.    Okay to see?  ----- Message -----  From: Anamaria Winters  Sent: 10/3/2024   3:54 PM CDT  To: Saundra FLORES Staff  Subject: schedule                                        Request Appt    Call Back Number:[phone]  Additional Information:

## 2024-10-28 DIAGNOSIS — E78.00 HYPERCHOLESTEREMIA: ICD-10-CM

## 2024-10-28 RX ORDER — ATORVASTATIN CALCIUM 20 MG/1
20 TABLET, FILM COATED ORAL NIGHTLY
Qty: 90 TABLET | Refills: 3 | Status: SHIPPED | OUTPATIENT
Start: 2024-10-28

## 2025-01-22 ENCOUNTER — TELEPHONE (OUTPATIENT)
Dept: CARDIOLOGY | Facility: CLINIC | Age: 80
End: 2025-01-22
Payer: MEDICARE

## 2025-01-22 NOTE — TELEPHONE ENCOUNTER
----- Message from Jamie sent at 1/22/2025 10:04 AM CST -----  Regarding: pt  Name of Who is Calling:Pt         What is the request in detail: Inquiring if office will be open on tomorrow for his appt            Can the clinic reply by [facility]: no         What Number to Call Back if not in "[employer]:Telephone Information:  Mobile          [phone]

## 2025-01-27 ENCOUNTER — OUTPATIENT CASE MANAGEMENT (OUTPATIENT)
Dept: NEUROLOGY | Facility: CLINIC | Age: 80
End: 2025-01-27
Payer: MEDICARE

## 2025-01-27 DIAGNOSIS — R46.89 COGNITIVE AND BEHAVIORAL CHANGES: Primary | ICD-10-CM

## 2025-01-27 DIAGNOSIS — R41.89 COGNITIVE AND BEHAVIORAL CHANGES: Primary | ICD-10-CM

## 2025-01-27 PROCEDURE — 99358 PROLONG SERVICE W/O CONTACT: CPT | Mod: S$PBB,,, | Performed by: PSYCHIATRY & NEUROLOGY

## 2025-01-27 RX ORDER — FUROSEMIDE 40 MG/1
TABLET ORAL
Qty: 30 TABLET | Refills: 3 | Status: SHIPPED | OUTPATIENT
Start: 2025-01-27

## 2025-01-27 NOTE — PROGRESS NOTES
[facility]  Brain Health and Cognitive Disorders Program    PATIENT: Doroteo Judge  DATE: 01/27/2025  MRN: [id number]  PRIMARY PROVIDER: Moy Casper MD    Future Appointments   Date Time Provider Department Center   1/29/2025  2:15 PM Nuno Arguello MD [facility] JAYLA Ramos   2/19/2025  3:40 PM Adolph Barboza MD [id number]     Pre-Clinical Chart Review:     I conducted a thorough review of previous records and/or communicated with other professionals or the patient's family for a total duration of 40 minutes (from 10:40 AM to 11:20 AM) on on 01/26/2025. This activity is directly related to the face-to-face Evaluation and Management service provided to the patient.    For billing purposes, the CPT code for prolonged evaluation and management services, including non-face-to-face review of records or communications with the patient's family or other medical professionals, is 39581.    See below for the review of these [facility] EMR records:      Clinical Timeline:    (Event Date: 2024-05) An 79-year-old male was diagnosed with dementia with a Mini-Cog score of 1 after presenting with memory issues, specifically forgetting recent events, and was referred to neurology for further evaluation and management.  (Event Date: 2024-06) The neurologist evaluated the patient and noted progressive cognitive decline over two years, focusing on amnestic and navigational issues, with a MOCA score of 19/30, and discussed further tests and treatment options including Memantine.  (Event Date: 2024-09) During a follow-up, the neurologist reviewed MRI findings showing generalized atrophy and suspected Alzheimer's-related changes, discussed infusion therapies and initiated Memantine treatment to address the patient's cognitive decline, while monitoring driving safety concerns.    Clinical History Summary:     In mid-May 2024, an 79-year-old male with chronic conditions, including coronary atherosclerosis,  essential hypertension, and hypercholesterolemia, was evaluated by his primary care physician. His wife noted that he had been experiencing difficulties with memory, particularly with recent events, for several months. Despite these cognitive challenges, he was still able to perform his activities of daily living independently. On presentation, a comprehensive review of systems was negative apart from the cognitive concerns. The patient was on multiple medications, including aspirin, atenolol, atorvastatin, lisinopril, tamsulosin, diazepam, furosemide, and montelukast. His vital signs were stable, and a physical examination was largely unremarkable except for the presence of a carotid bruit and 2+ pitting edema in the lower extremities. Neurologically, he was alert with no focal deficits. Lab results from late 2023 showed mild anemia and low HDL cholesterol levels. An assessment suggested possible dementia, prompting the ordering of an MRI and a neurology referral. In mid-June 2024, the patient was seen by a neurologist due to cognitive decline over the previous two years, characterized by amnestic complaints and difficulties with navigation in familiar areas. His wife recounted incidents where he got lost and required assistance. Despite these cognitive issues, he remained independent in his daily activities, although he reported significant weight loss and a diet lacking variety. The patient denied language changes, hallucinations, and sleep disturbances but acknowledged stress related to finances and family matters. The examination was consistent with executive functioning and memory issues, scoring 19/30 on the MOCA. An MRI was recommended to evaluate structural changes, and discussions included the possibility of medication for cognitive decline. By mid-September 2024, the patient continued to exhibit cognitive challenges, with a focus on amnestic and executive functioning issues. 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 The neurologist discussed the potential association with Alzheimer's disease and various treatment options, including infusion therapies and oral medications. The patient's wife expressed interest in infusion therapy, and further evaluations, including amyloid and APOE testing, were planned. The neurologist also discussed starting Memantine to slow cognitive decline and addressed driving safety concerns. The patient and his family were advised to consider these options carefully as part of ongoing management.      Relevant History of Baseline Neurocognitive Phenotype:    Educational History: HS.      Relevant Neurotrauma History:    History of Traumatic Brain Injury: No History of Traumatic Brain Injury or Concussions  History of Brain/Spine Surgery: No History of Iatrogenic Brain Injury or CNS surgery  History of Toxin Exposure/Substance Abuse: No History of Toxin Exposure or Clinically Relevant Substance Abuse  History of Malnutrition/Vitamin Deficiency: No History of Malnutrition or Clinically Relevant Vitamin Deficiency  History of Chronic Mood Disorder/Stress: No History of Clnically Relevant Chronic Mood Disorder or Stress  History of Chronic Inflammatory State: No History of CNS inflammation or Clinically Relevant Chronic Inflammatory State      Relevant Family History:    Relevant General History:  Mother -  at age  Father -  at age  Relevant Neurocognitive Disorder History:  The patient/family denies a history of early/late onset cognitive impairment.  Relevant Movement Disorder History:  The patient/family denies a history of PD, PDD, tremor.  Known Genetic Profile: There is no relevant genetic testing available on record.      Neurologically Relevant Imaging:    MRI brain/head without contrast performed on 2024  Radiologist Interpretation: 1. Moderate  chronic microvascular ischemic disease. 2. Ventriculomegaly mildly out of proportion with the cerebral atrophy. Correlate with clinical symptomatology. 3. Patchy fluid signal intensity within the right mastoid air cells, which could represent effusion or inflammatory changes of indeterminate age. Correlate clinically.  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 There are scattered subcortical white matter changes and at least three subcortical microbleeds, suggesting a mixed pathology. This may be consistent with neurodegeneration presenting as frontotemporal degeneration and cerebral amyloid angiopathy.  T1-weighted (T1W) Image Summary: The radiographic interpretation indicates mild generalized cortical atrophy, with regional atrophy most evident in the frontotemporal areas. There is notable volume loss in the anterior cingulate and occipital-related sulci. Additionally, there is pronounced and severe anterior temporal atrophy, which is more significant on the right side compared to the left. Furthermore, there is severe bilateral hippocampal volume loss.  FLAIR/T2-weighted (T2W) Image Summary: The radiographic interpretation reveals a moderate degree of scattered white matter changes, predominantly fluent and subcortical in nature. These changes are more pronounced anteriorly than posteriorly, with periventricular capping observed. There is an indeterminate age right thalamic infarct present. Additionally, white matter hyperintensities are seen ascending and extending across the corpus callosum with a posterior gradient. Secondary infarcts are noted in the bilateral basal ganglia, accompanied by scattered white matter hyperintensities extending up the corona radiata and bilaterally reaching the centrum semiovale.  Diffusion Weighted Imaging with ADC Mapping  Summary: There are no significant hyperintensities or hypointensities observed on Diffusion-Weighted Imaging (DWI). Additionally, there is no apparent correlation with the Apparent Diffusion Coefficient (ADC).  Susceptibility-weighted imaging (SWI) and/or GRE Summary: The radiographic interpretation indicates the presence of microbleeds in the right parietal cortex, the left and right subcortical regions of the internal capsule, and the right centrum semiovale.            Differential Diganosis:    FTLD with CAA  not a candidate for infusion therapy given CMBs  MOCA 19/30       This note was dictated using the M*Modal Fluency Direct voice recognition program. Please be aware that word recognition errors may occasionally occur and might be overlooked during review.

## 2025-01-29 ENCOUNTER — OFFICE VISIT (OUTPATIENT)
Facility: CLINIC | Age: 80
End: 2025-01-29
Payer: MEDICARE

## 2025-01-29 VITALS
BODY MASS INDEX: 22.53 KG/M2 | SYSTOLIC BLOOD PRESSURE: 152 MMHG | WEIGHT: 170 LBS | DIASTOLIC BLOOD PRESSURE: 70 MMHG | HEART RATE: 70 BPM | HEIGHT: 73 IN

## 2025-01-29 DIAGNOSIS — R90.89 OTHER ABNORMAL FINDINGS ON DIAGNOSTIC IMAGING OF CENTRAL NERVOUS SYSTEM: ICD-10-CM

## 2025-01-29 DIAGNOSIS — F03.A0 MILD DEMENTIA, UNSPECIFIED DEMENTIA TYPE, UNSPECIFIED WHETHER BEHAVIORAL, PSYCHOTIC, OR MOOD DISTURBANCE OR ANXIETY: Primary | ICD-10-CM

## 2025-01-29 PROCEDURE — 99215 OFFICE O/P EST HI 40 MIN: CPT | Mod: S$PBB,,, | Performed by: PSYCHIATRY & NEUROLOGY

## 2025-01-29 PROCEDURE — 96138 PSYCL/NRPSYC TECH 1ST: CPT | Mod: 59,S$PBB,, | Performed by: PSYCHIATRY & NEUROLOGY

## 2025-01-29 PROCEDURE — 96116 NUBHVL XM PHYS/QHP 1ST HR: CPT | Mod: 59,S$PBB,, | Performed by: PSYCHIATRY & NEUROLOGY

## 2025-01-29 PROCEDURE — 96132 NRPSYC TST EVAL PHYS/QHP 1ST: CPT | Mod: 59,S$PBB,, | Performed by: PSYCHIATRY & NEUROLOGY

## 2025-01-29 PROCEDURE — 99213 OFFICE O/P EST LOW 20 MIN: CPT | Mod: PBBFAC | Performed by: PSYCHIATRY & NEUROLOGY

## 2025-01-29 PROCEDURE — 99999 PR PBB SHADOW E&M-EST. PATIENT-LVL III: CPT | Mod: PBBFAC,,, | Performed by: PSYCHIATRY & NEUROLOGY

## 2025-01-29 PROCEDURE — 99417 PROLNG OP E/M EACH 15 MIN: CPT | Mod: S$PBB,,, | Performed by: PSYCHIATRY & NEUROLOGY

## 2025-01-29 PROCEDURE — 96116 NUBHVL XM PHYS/QHP 1ST HR: CPT | Mod: PBBFAC,59 | Performed by: PSYCHIATRY & NEUROLOGY

## 2025-01-29 NOTE — PROGRESS NOTES
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 orally every evening.  Cyclobenzaprine (Flexeril) 10 mg Tablet: Take 10 mg orally nightly as needed.  Diazepam (Valium Oral): Take orally daily.  Donepezil (Aricept) 5 mg Tablet: Take 5 mg orally every evening. (Not currently taking as of 7/23/2024)  Furosemide (Lasix) 40 mg Tablet: Take one tablet orally every day.  Lisinopril (Prinivil, Zestril) 20 mg Tablet: Take 20 mg orally once daily.  Meclizine (Antivert) 25 mg Tablet: Take 25 mg orally daily.  Montelukast (Singulair) 10 mg Tablet: Take 10 mg orally daily.  Tamsulosin (Flomax) 0.4 mg Capsule: Take 0.4 mg orally once daily.            Review of cognitive, visuospatial, motor, sensory, and behavioral systems:     Memory  The patient's memory has worsened relative to their baseline.  The patient does repeat statements or asks the same question repeatedly.  The patient does have difficulty remembering recent important conversations.  The patient does forget information within minutes.  the patient denies new difficulty with recall events with high fidelity/accuracy.  The patient does not have difficulty remembering recent events.  The patient's recent retrograde memory is intact.  The patient's remote memory is  intact.  Attention  The patient's attention and concentration are impaired.  The patient does not have attentional fluctuations.  The patient does not have difficulty maintaining selective attention.  The patient does become easily distracted.  The patient does have difficulty with divided attention.  Executive  The patient's cognitive ability to process and respond to information has slowed.  The patient's cognitive ability to process and respond to information has not slowed significantly .  The patient's cognitive ability to manage time is not effected.  The patient's cognitive ability to manage time is not severely effected.  The patient does have difficulty with working memory such as occasional reliance on external aids like notes.  The patient does misplace personal items (e.g., keys, cell phone, wallet) more frequently.  The patient does have significant working memory impairment interfering with day to day tasks.  The patient does have difficulty with Goal-Directed Behavior.  The patient does have difficulty keeping track of their medications.  The patient is not having major difficulty keeping track of medications independently.  The patient does have difficulty with planning/organizing/completing multistep tasks requires assistance from others.  The patient experiences challenges with multi-step processes that require planning, sequencing, and follow-through, impacting their ability to manage complex tasks independently.  The patient does not have difficulty with recognizing and adjusting for mistakes.  The patient has not shown to have difficulty with judgement.  The patient does not have difficulty understanding abstract concepts or relationship.  The patient's insight into their health and situation is intact.  Language  The patient's speech has been not affected.  The patient's speech is fluent and non-effortful.  The patient does forget places/people's names more frequently.  The patient does have  word-finding difficulties.  The patient does not make word substitutions.  The patient's speech is grammatically intact.  The patient does not appear to have impaired sentence fluency, repetition,and phonological processing.  The patient does not appear to have impaired comprehension.  The patient does not appear to have difficulty comprehending and understanding written text.  Visuospatial  The patient has become confused or disoriented in new, unfamiliar places.  The patient does have trouble navigating.  The patient does not get lost in familiar places.  The patient does not have visuospatial disorientation.  The patient denies problems with driving or parking.  The patient does have difficulty recognizing objects or faces.  Motor/Coordination  The patient does have difficulty with walking.  The patient does feel imbalanced.  The patient has fallen.  The patient does appear to have new motor deficits.  The patient does not have difficulty buttoning shirts, operating zippers, or manipulating tools/utensils.  The patient reports new slow, small dysrhythmic movement.  The patient does not have a resting tremor.  The patient's handwriting has not become micrographic.  The patient denies restlessness.  The patient denies new and/or worsening simple repetitive behaviors.  The patient denies a change of self-stimulating behavior.  The patient's speech has not become simplified or become repetitive/stereotyped.  The patient denies having any new involuntary movements and/or muscle jerking.  The patient denies new muscle cramps and twitching.  The patient does not have swallowing difficulty.  Sensory  The patient denies new numbness, tingling, paresthesias, or pain.  The patient reports new loss of vision, blurry vision, and/or double vision.  The patient reports a recent loss of hearing and/or worsening tinnitus.  The patient denies anosmia.  The patient does not have hypersensitivity to environmental stimuli.  The  patient does not have severe hypersensitivity to environmental stimuli.  Sleep  The patient denies difficulty sleeping.  The patient does not have difficulty going to sleep.  The patient denies difficulty staying asleep or frequently awakening at night.  The patient reports snoring.  The patient does not snore or have witnessed apneas while sleeping.  When the patient wakes up in the morning, the patient does feel well-rested.  The patient denies dream-enactment behavior.  The patient denies per-nocturnal jerking.  The patient denies symptoms suggestive of restless leg syndrome.  Neurobehavioral  The patient's personality has not changed.  The patient does note have difficulty with self-control.  The patient denies new impulsivity or rash/careless actions.  The patient does not appear to have a change in inertia.  The patient does not appear apathetic or has decreased motivation.  The patient does not have difficulty with understanding and responding to social cues.  The patient is not exhibiting a diminished response to other people's needs and feelings.  The patient is not exhibiting symptoms of social withdrawal/indifference.  The patient is not exhibiting a diminished social interest, interrelatedness, or personal warmth.  The patient does not have symptoms to suggest a loss of manners or decorum.  The patient does not have symptoms of disinhibition and social inappropriateness.  The patient's personal hygiene is intact.  The patient does not have difficulty with flexible thinking.  The patient denies any change in rigid behavior.  The patient does not have symptoms of hyper-religiosity or dogmatism.  The patient's interests/pleasures have not become restrictive, simplified, interrupting, or repetitive.  The patient has not shown new perseverative behavior.  The patient denies new/worsening complex repetitive/ritualistic compulsions and behaviors.  The patient denies any changes in eating behavior.  The patient  denies increased consumption of food or substances.  The patient denies oral exploration or consumption of inedible objects.  Psychiatric  The patient does have difficulty with managing emotional responses effectively.  The patient does have symptoms of irritability and mood lability.  The patient does not exhibit hyperactive behavior.  The patient does not have symptoms of agitation, aggression, or violent outbursts.  The patient's emotional expression has changed.  The patient does have emotional blunting.  The patient does not feel depressed.  The patient denies anxiety.  The patient does not exhibit cycling behavior.  The patient is not exhibited symptoms of paranoia.  The patient does not have delusions.  The patient does not have hallucinations.  Medical Review of Systems  The patient does not have constipation.  The patient does not have diarrhea.  The patient does not have urinary incontinence.  The patient denies orthostatic lightheadedness.  The patient's weight is unstable. Comment: 40 lbs between [id number]  The patient does not have a history of sensitivity to neuroleptic/psychotropic medications.            Functional Capacity Assessment: Neurological Wellbeing, Intelligence, and Social Evaluation:     Instrumental Activities of Daily Living:   Communal Operations: Moderate level of inability to perform independantly.  Finances: Mild level of inability to perform independantly.  Housework: Normal level of functional independance.  Personal Care: Mild level of inability to perform independantly.  Personal Health: Moderate level of inability to perform independantly.  Recreation: Mild level of inability to perform independantly.  Transportation: Moderate level of inability to perform independantly.  Basic Activities of Daily Living:   Axial Motor: Normal level of functional independance.  Grooming: level of inability to perform independantly.  Hygeine: Normal level of functional  independance.  Oropharngeal Motor: Normal level of functional independance.  Personal Health: Normal level of functional independance.  Toiletry: Normal level of functional independance.  Functional Capacity Scales:   IADL: Score 4/8 suggestive of Mild dependence.  FAST: Score 3/16 suggestive of Mild Cognitive Impairment (Stage 3).  FAS: Score 12/30 suggestive of Definite Functional Impairment.  CDR-SOB: Score 4.5/18 suggestive of Prodromal/Very mild dementia.  Global CDR: Score 1/3 suggestive of Prodromal/Very mild dementia.  Memory: 1  Orientation: 0.5  Judgment & Problem Solvin  Community Affairs: 1  Home and Hobbies: 1  Personal Care: 0            Neurological Examination:     Mental Status  The patient's appearance is normal (hygiene is appropriate; attire is proper and clean).  Throughout the interview, the patient is cooperative, the patient's eye contact is appropriate.  The patient behavior is appropriate to the clinical context without impropriety or improper language/conduct.  The patient's energy level is abnormal.  The patient's orientation is not entirely accurate.  The patient's attention/concentration is impaired.  The patient is unable to complete three-step commands without making errors.  The patient fund of knowledge was appropriate for age, culture, and level of education.  The patient's thought process is logical and goal-oriented.  The patient demonstrated impaired insight based on actions, awareness of the patient's illness, plans for the future.  The patient demonstrated impaired judgment based on actions and plans for the future.  Cranial Nerves  The patient showed no evidence of anosmia 3/3 (coffee/vanilla/cinnamon).  The patient's pupils were normal.  The patient's visual fields were full to confrontation in all quadrants.  The patient's ocular pursuit was impaired.  The patient's saccadic initiation, velocity, and amplitude are normal.  The patient demonstrated no square-wave  "jerks.  The patient's eyelid assessment showed abnormalities.  The patient blink rate was abnormal.  The patient's facial strength was normal.  The patient's facial expression was abnormal.  The patient's facial sensation was intact to light touch bilaterally.  The patient's hearing was normal bilaterally.  The patient's oropharynx and soft palate appeared abnormal.  The patient's uvula is mid-line.  The patient's tongue showed no evidence of scalloping.  The patient can protrude their tongue beyond The patient's lips for >10 sec.  The patient can move their extended tongue back and forth rapidly.  The patient's tongue showed no evidence of fasciculation or scalloping.  The patient's sternocleidomastoid and trapezius muscle strength was full bilaterally.  The patient had no significant evidence of anterocollis or retrocollis.  Speech/Language  The patient's speech was fluent, non-effortful, and the patient's rate was appropriate to the context.  The patient's speech volume is within normal range and appropriate to the context.  The patient's speech rate is normal.  The patient's respirations are within normal range and appropriate to context.  The patient's speech timbre is normal.  The patient has no articulation (segmental features) errors.  The patient has no speech dysdiadochokinesia with repetition of syllables such as "/PA/, /TA/, /KA/, /OM/".  The patient's pitch assessment showed normal range, intonation (Pitch Pattern), and variability.  The patient's tone was not emotionally limited, and tempo was rhythmic (e.g., "Once upon a midnight dreary, while I pondered, weak and weary, Over many a quaint and curious volume of forgotten celestino" and "That government of the people, by the people, for the people, shall not perish from the earth").  The patient's speech is not dysarthric.  The patient's speech was without evidence of anomia.  The patient makes no phonological loop errors.  The patient's speech is " grammatically intact; (no function/semantic word substitutions, phonemic/semantic paraphasias, or binary confusion).  Motor  The patient's bilateral upper extremity muscle bulk is appropriate.  The patient's upper extremity muscle tone is increased.  The patient's bilateral upper extremity muscle tone does not suggest spasticity.  There is evidence of rigidity/cogwheeling.  There is evidence of paratonia.  There was no dystonia observed on examination.  Assessment of motor strength was symmetric and at minimal anti-gravity.  Deltoid L +5/5 R +5/5 Biceps L +5/5 R +5/5 Triceps L +5/5 R +5/5 Wrist extension L +5/5 R +5/5 Finger abduction L +5/5 R +5/5 Hip flexion L +5/5 R +5/5 Hip extension L +5/5 R +5/5 Knee flexion L +5/5 R +5/5 Knee extension L +5/5 R +5/5 Ankle flexion L +5/5 R +5/5 Ankle extension L +5/5 R +5/5  There is no pronator or downward drift.  There is no myoclonus observed in the patient bilateral upper and lower extremities.  There are no fasciculations observed in the patient bilateral upper and lower extremities.  Coordination  The patient has no bilateral upper extremity limb dysmetria or past pointing on finger-nose-finger bilaterally.  The patient has no bilateral lower extremity limb dysmetria during shin rub.  The patient has no limb dysdiadochokinesia of the upper extremity on the pronation/supination test and screwing in a light bulb or lower extremity during tapping ball of each foot bilaterally.  The patient has no cerebellar rebound bilaterally.  The patient has a visible tremor.  The patient has no kinetic tremor bilaterally.  The patient has no postural tremor bilaterally.  The patient has a resting tremor.  The patient has evidence of interhemispheric motor control deficits.  The patient demonstrates evidence of motor overflow.  The patient demonstrates no alien limb phenomena.  The patient has dyskinetic movements.  The patient has no akathisia.  The patient's upper extremity fine motor  coordination was abnormal.  Higher Cortical Function  The patient had no hemineglect (e.g., line bisection/Samy's test).  The patient showed no evidence of simultanagnosia (Navon hierarchical letters).  The patient showed evidence of visuospatial constructional dysfunction.  The patient showed no evidence of agnosia.  The patient showed no evidence of angular gyrus disconnection (insular-operculum).  The patient has no evidence of dysgraphia.  The patient showed no evidence of apraxia.  The patient showed no dysexecutive behavior.  Sensory  The patient's cortical sensory assessment demonstrated no neglect bilaterally.  The patient's sensation was diminished to light touch, and vibratory sense.  Reflexes  Reflexes were symmetric and 2+ at biceps, 2+ triceps, and 2+ brachioradialis, 2+ at the knees bilaterally, there was no cross-abductor sign, 2+ in the bilateral ankles.  There were no pathological reflexes; No Babinski, bilateral plantar toes go down, no Jaw Jerk Reflex, No Cervantes, No Palmomental reflex, and No Palmar Grasp reflex.  There was no spreading/clonus.  Gait  The patient has normal posture sitting unaided.  The patient is unable to rise from a chair and sit back down without using their arms.  The patient's gait was abnormal.  The patient's posture while walking is abnormal.  The patient's gait initiation/inhibition was normal.  The patient's stance while walking is abnormal.  The patient's gait speed was abnormal (70-80 F 1.13 m/s M 1.26 m/s, >80 F 0.94 m/sec, M 0.97 m/sec).  The patient's stride (gait cycle) was abnormal.  The patient's arms swing is abnormal.  The patient takes turns in >4 steps.  The patient has truncal ataxia.  When attempting to walk abnormally (heels, tiptoes, tandem), the patient makes errors.  While walking on the patient's tiptoes for ten steps, the patient makes deviations.  While walking on the patient's heels for ten steps, the patient makes no deviations.  While walking  tandem for ten steps, the patient makes deviations.  While walking with eyes closed for ten steps, the patient makes deviations.  The patient has evidence of posture/balance impairment.  Retropulsion testing showed abnormal recovery.  The patient has evidence of a specific gait disorder.  The patient has evidence of parkinsonism gait disorder.            Neurocognitive Assessment:     Question Score Interpretation   Memory   Registration T1 (3) 2/3 Borderline Impairment based on age and education.   Registration T1 (5) 2 Within Normal Limits.   Registration T1 (9) 2/9 Severe Impairment: -3.5 STDs below the average score based on age and education.   Registration T2 (9) 4/9 Severe Impairment: -4.3 STDs below the average score based on age and education.   Registration T3 (9) 5/9 Moderate Impairment: -2.2 STDs below the average score based on age and education.   Registration T4 (9) 4/9 Severe Impairment: -5.7 STDs below the average score based on age and education.   Registration T5 (9) 3/9 Severe Impairment: -7.1 STDs below the average score based on age and education.   Executive   Three-step command 3/3 Within Normal Limits.   WORLD Backward 4/5 Borderline Impairment based on age and education.   Digit Span Backwards 5 Within Normal Limits.   Digit Span - 2 2/2 Within Normal Limits.   Fluency 1/1 Within Normal Limits.   Lexical Fluency - F 10 Mild Impairment: -1.3 STDs below the average score based on age and education.   Lexical Fluency - A 10 Mild Impairment: -1.3 STDs below the average score based on age and education.   Lexical Fluency - S 15 Within Normal Limits.   Semantic Fluency - Animals 7 Moderate Impairment: -2.1 STDs below the average score based on age and education.   Semantic Fluency - Vegetables 8 Mild Impairment: -2 STDs below the average score based on age and education.   Trials-1 1/1 Within Normal Limits.   Visuospatial   Clock Draw 2/3 Borderline Impairment based on age and education.    Overlap Images Total 10/10 Within Normal Limits.   Overlap Images (Illusion) 10 Within Normal Limits.   Picture Synthesis 3/3 Within Normal Limits.   Pareidolia Total 20/20 Within Normal Limits.   Pareidolia (+Face) 10/10 Within Normal Limits.   Pareidolia (+No Face) 10/10 Within Normal Limits.   Language   Naming-2 2/2 Within Normal Limits.   Naming-3 3/3 Within Normal Limits.   15-Item BNT 14/15 Within Normal Limits.   Repetition-1 1/1 Within Normal Limits.   Repetition-2 2/2 Within Normal Limits.   Repetition of Phrases 5/5 Within Normal Limits.   Verbal Agility 6/6 Within Normal Limits.   Following written command 1/1 Within Normal Limits.   Writing a complete sentence 1/1 Within Normal Limits.   Repeat & Point - Semantics 10/10 Within Normal Limits.   Abstraction 2/2 Within Normal Limits.   Attention   Orientation-10 10/10 Within Normal Limits.   Orientation-6 6/6 Within Normal Limits.   Digit Span Forwards 7 Within Normal Limits.   Alternating Sequence 1/1 Within Normal Limits.   Social   FTLD Questionnaire  20/20 Within Normal Limits.   Theory of Mind Cartoon 1/1 Within Normal Limits.   Motor/Sensory   Limb-Kinetic Apraxia 10/10 Within Normal Limits.     Clinical Impression of Neurocognitive Assessment: Memory predominant multidomain major cognitive impairment.          Laboratories:       No Laboratory Results Available for Review              Neurological Relevant Procedures:    Electrocardiogram performed on 07/23/2024  Formal Interpretation: Vent. Rate : 050 BPM Atrial Rate : 050 BPM P-R Int : 214 ms QRS Dur : 130 ms QT Int : 460 ms P-R-T Axes : 063 -55 047 degrees QTc Int : 419 ms  Provider Interpretation: The radiographic interpretation indicates the presence of sinus bradycardia accompanied by a first-degree atrioventricular block. There is also evidence of left axis deviation and left ventricular hypertrophy, which is associated with widening of the QRS complex.            Neurologically Relevant  Imaging:    MRI brain/head without contrast performed on 07/17/2024  Radiologist Interpretation: 1. Moderate chronic microvascular ischemic disease. 2. Ventriculomegaly mildly out of proportion with the cerebral atrophy. Correlate with clinical symptomatology. 3. Patchy fluid signal intensity within the right mastoid air cells, which could represent effusion or inflammatory changes of indeterminate age. Correlate clinically.  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 There are scattered subcortical white matter changes and at least three subcortical microbleeds, suggesting a mixed pathology. This may be consistent with neurodegeneration presenting as frontotemporal degeneration and cerebral amyloid angiopathy.  T1-weighted (T1W) Image Summary: The radiographic interpretation indicates mild generalized cortical atrophy, with regional atrophy most evident in the frontotemporal areas. There is notable volume loss in the anterior cingulate and occipital-related sulci. Additionally, there is pronounced and severe anterior temporal atrophy, which is more significant on the right side compared to the left. Furthermore, there is severe bilateral hippocampal volume loss.  FLAIR/T2-weighted (T2W) Image Summary: The radiographic interpretation reveals a moderate degree of scattered white matter changes, predominantly fluent and subcortical in nature. These changes are more pronounced anteriorly than posteriorly, with periventricular capping observed. There is an indeterminate age right thalamic infarct present. Additionally, white matter hyperintensities are seen ascending and extending across the corpus callosum with a posterior gradient. Secondary infarcts are noted in the bilateral basal ganglia, accompanied by scattered white matter hyperintensities extending up  the corona radiata and bilaterally reaching the centrum semiovale.  Diffusion Weighted Imaging with ADC Mapping Summary: There are no significant hyperintensities or hypointensities observed on Diffusion-Weighted Imaging (DWI). Additionally, there is no apparent correlation with the Apparent Diffusion Coefficient (ADC).  Susceptibility-weighted imaging (SWI) and/or GRE Summary: The radiographic interpretation indicates the presence of microbleeds in the right parietal cortex, the left and right subcortical regions of the internal capsule, and the right centrum semiovale.               Billing Statement:       I spent a total of 95 minutes (from 03:20 PM to 04:55 PM) on 01/26/2025, engaging in person in a face-to-face consultation with the patient. More than 50% of this time was devoted to counseling on symptoms, treatment plans, risks, therapeutic options, lifestyle modifications, and safety concerns related to the above diagnoses.     A Review of Systems was completed, encompassing 10 of the 14 systems. All findings were negative, except those noted in the History of Present Illness (HPI) and Review of Systems (ROS) Sections. The systems reviewed were Constitutional (Const), Eyes, Ear/Nose/Throat (ENT), Respiratory (Resp), Cardiovascular (CV), Gastrointestinal (GI), Genitourinary (), Musculoskeletal (MSK), Skin, and Neurological (Neuro).     I spent a total of 5 minutes to reviewing previous laboratory results on the day of the clinical evaluation. This review was an integral part of the face-to-face encounter. The laboratory findings were predominantly negative, with exceptions as noted in the History of Present Illness (HPI) and Assessment.     I spent a total of 5 minutes to reviewing previous diagnostic tests on the day of the clinical evaluation. This activity was directly associated with the face-to-face encounter. The findings from these diagnostic tests were generally within normal limits, with any  exceptions as noted in the History of Present Illness (HPI) and Assessment.     I performed a neurobehavioral status examination on the day of clinical evaluation that included a clinical assessment of thinking, reasoning, and judgment to ensure a comprehensive approach in managing the complex and evolving needs of the patient's neurocognitive condition. Please see above HPI and ROS for full details. This exam was performed on the day of clinical evaluation and included 17 minutes spent on direct face-to-face clinical observation and interview with the patient and 21 minutes spent interpreting test results and preparing the report. The total time of 38 minutes spent on the neurobehavioral status examination is not included in the time spent on evaluation and management coding.     In response to concerns about changes in the patient's cognitive functioning, I conducted a comprehensive neuropsychological evaluation to assess these changes and their impact on daily life. This evaluation involved both face-to-face and non-face-to-face services. During the face-to-face sessions, I engaged directly with the patient to gather a comprehensive history and understand current concerns. I assessed the patients behavior, mood, and affect during the evaluation. Informed consent was obtained prior to testing. The administration and scoring of standardized neurocognitive tests, which objectively measure various cognitive functions - including memory, attention, executive functions, visuospatial skills, and language - were conducted during this time. These tests were selected based on the patients symptoms and medical history to ensure an accurate assessment. The face-to-face time spent on clinical history-taking, behavioral assessment, and feedback to the patient totaled 19 minutes, billed under CPT code 15753. In the non-face-to-face component, I reviewed and synthesized patient records, prior test results, and relevant medical  history. I analyzed results from the administered neuropsychological tests to derive clinical insights. I formulated diagnostic impressions and developed treatment or intervention plans based on the evaluation. I documented findings, interpretations, and recommendations in a comprehensive report. I provided feedback to the patient and their caregiver, discussing the test findings and outlining recommended next steps. These non-face-to-face activities totaled 15 minutes, billed under CPT code 03610. The total time spent on evaluation services was 34 minutes, strictly excluding any time associated with test administration or scoring. It is important to note that the time spent on this evaluation is separate from any evaluation and management services provided on the same day. Detailed findings, interpretations, and recommendations are documented in the neuropsychological assessment report. This comprehensive cognitive assessment is essential for establishing a clear cognitive baseline, accurately diagnosing the patients condition, facilitating targeted interventions and personalized care plans, and providing a basis for ongoing monitoring and adjustment of the care plan.     In response to concerns about changes in the patient's cognitive functioning, a comprehensive neuropsychological evaluation was conducted to assess these changes and their impact on daily life. A series of standardized neurocognitive tests were administered by a technician to objectively measure various cognitive functions, including memory, attention, executive functions, visuospatial skills, and language. These tests were selected based on the patient's symptoms and medical history to ensure an accurate assessment. Informed consent was obtained prior to testing. The face-to-face administration and scoring of these tests, performed by a qualified technician, took 35 minutes, billed under CPT code 71297.     Total Billing time spent on  encounter/documentation for this patient's evaluation and management, not including the Neurobehavioral Status Examination and Neuropsychological Evaluation - Interpretation and Neuropsychological Evaluation - Administration by Technician: 69 minutes.           The care plan was developed collaboratively with the patient and caregiver, addressing their goals of maintaining functional independence while planning for potential disease progression.     Thank you for entrusting us with the care of your patient. Should you have any questions or concerns, please feel free to contact us at your convenience.     This note was dictated using the M*Modal Fluency Direct voice recognition program. Please be aware that word recognition errors may occasionally occur and might be overlooked during review.            Signing Physician:  Nuno Arguello MD

## 2025-01-30 ENCOUNTER — LAB VISIT (OUTPATIENT)
Dept: LAB | Facility: HOSPITAL | Age: 80
End: 2025-01-30
Attending: PSYCHIATRY & NEUROLOGY
Payer: MEDICARE

## 2025-01-30 DIAGNOSIS — F03.A0 MILD DEMENTIA, UNSPECIFIED DEMENTIA TYPE, UNSPECIFIED WHETHER BEHAVIORAL, PSYCHOTIC, OR MOOD DISTURBANCE OR ANXIETY: ICD-10-CM

## 2025-01-30 DIAGNOSIS — R90.89 OTHER ABNORMAL FINDINGS ON DIAGNOSTIC IMAGING OF CENTRAL NERVOUS SYSTEM: ICD-10-CM

## 2025-01-30 LAB
ALBUMIN SERPL BCP-MCNC: 4.2 G/DL (ref 3.5–5.2)
ALP SERPL-CCNC: 80 U/L (ref 40–150)
ALT SERPL W/O P-5'-P-CCNC: 21 U/L (ref 10–44)
ANION GAP SERPL CALC-SCNC: 9 MMOL/L (ref 8–16)
AST SERPL-CCNC: 27 U/L (ref 10–40)
BASOPHILS # BLD AUTO: 0.03 K/UL (ref 0–0.2)
BASOPHILS NFR BLD: 0.5 % (ref 0–1.9)
BILIRUB SERPL-MCNC: 1 MG/DL (ref 0.1–1)
BUN SERPL-MCNC: 18 MG/DL (ref 8–23)
CALCIUM SERPL-MCNC: 8.9 MG/DL (ref 8.7–10.5)
CHLORIDE SERPL-SCNC: 105 MMOL/L (ref 95–110)
CO2 SERPL-SCNC: 25 MMOL/L (ref 23–29)
CREAT SERPL-MCNC: 1.3 MG/DL (ref 0.5–1.4)
DIFFERENTIAL METHOD BLD: ABNORMAL
EOSINOPHIL # BLD AUTO: 0.1 K/UL (ref 0–0.5)
EOSINOPHIL NFR BLD: 2.2 % (ref 0–8)
ERYTHROCYTE [DISTWIDTH] IN BLOOD BY AUTOMATED COUNT: 12.7 % (ref 11.5–14.5)
EST. GFR  (NO RACE VARIABLE): 54.8 ML/MIN/1.73 M^2
FOLATE SERPL-MCNC: 11 NG/ML (ref 4–24)
GLUCOSE SERPL-MCNC: 106 MG/DL (ref 70–110)
HCT VFR BLD AUTO: 37.6 % (ref 40–54)
HCYS SERPL-SCNC: 21.2 UMOL/L (ref 4–16.5)
HGB BLD-MCNC: 12.3 G/DL (ref 14–18)
IGG SERPL-MCNC: 991 MG/DL (ref 650–1600)
IMM GRANULOCYTES # BLD AUTO: 0.01 K/UL (ref 0–0.04)
IMM GRANULOCYTES NFR BLD AUTO: 0.2 % (ref 0–0.5)
LYMPHOCYTES # BLD AUTO: 1.3 K/UL (ref 1–4.8)
LYMPHOCYTES NFR BLD: 22.9 % (ref 18–48)
MAGNESIUM SERPL-MCNC: 2 MG/DL (ref 1.6–2.6)
MCH RBC QN AUTO: 31.1 PG (ref 27–31)
MCHC RBC AUTO-ENTMCNC: 32.7 G/DL (ref 32–36)
MCV RBC AUTO: 95 FL (ref 82–98)
MONOCYTES # BLD AUTO: 0.4 K/UL (ref 0.3–1)
MONOCYTES NFR BLD: 7 % (ref 4–15)
NEUTROPHILS # BLD AUTO: 3.9 K/UL (ref 1.8–7.7)
NEUTROPHILS NFR BLD: 67.2 % (ref 38–73)
NRBC BLD-RTO: 0 /100 WBC
PLATELET # BLD AUTO: 175 K/UL (ref 150–450)
PMV BLD AUTO: 11.4 FL (ref 9.2–12.9)
POTASSIUM SERPL-SCNC: 4.3 MMOL/L (ref 3.5–5.1)
PROT SERPL-MCNC: 7.3 G/DL (ref 6–8.4)
RBC # BLD AUTO: 3.95 M/UL (ref 4.6–6.2)
SODIUM SERPL-SCNC: 139 MMOL/L (ref 136–145)
T4 SERPL-MCNC: 5.5 UG/DL (ref 4.5–11.5)
TREPONEMA PALLIDUM IGG+IGM AB [PRESENCE] IN SERUM OR PLASMA BY IMMUNOASSAY: NONREACTIVE
TSH SERPL DL<=0.005 MIU/L-ACNC: 0.84 UIU/ML (ref 0.4–4)
WBC # BLD AUTO: 5.84 K/UL (ref 3.9–12.7)

## 2025-01-30 PROCEDURE — 85025 COMPLETE CBC W/AUTO DIFF WBC: CPT | Performed by: PSYCHIATRY & NEUROLOGY

## 2025-01-30 PROCEDURE — 82941 ASSAY OF GASTRIN: CPT | Performed by: PSYCHIATRY & NEUROLOGY

## 2025-01-30 PROCEDURE — 86593 SYPHILIS TEST NON-TREP QUANT: CPT | Performed by: PSYCHIATRY & NEUROLOGY

## 2025-01-30 PROCEDURE — 82746 ASSAY OF FOLIC ACID SERUM: CPT | Performed by: PSYCHIATRY & NEUROLOGY

## 2025-01-30 PROCEDURE — 84443 ASSAY THYROID STIM HORMONE: CPT | Performed by: PSYCHIATRY & NEUROLOGY

## 2025-01-30 PROCEDURE — 83520 IMMUNOASSAY QUANT NOS NONAB: CPT | Mod: 91 | Performed by: PSYCHIATRY & NEUROLOGY

## 2025-01-30 PROCEDURE — 84425 ASSAY OF VITAMIN B-1: CPT | Performed by: PSYCHIATRY & NEUROLOGY

## 2025-01-30 PROCEDURE — 84436 ASSAY OF TOTAL THYROXINE: CPT | Performed by: PSYCHIATRY & NEUROLOGY

## 2025-01-30 PROCEDURE — 82784 ASSAY IGA/IGD/IGG/IGM EACH: CPT | Performed by: PSYCHIATRY & NEUROLOGY

## 2025-01-30 PROCEDURE — 84393 TAU PHOSPHORYLATED EA: CPT | Performed by: PSYCHIATRY & NEUROLOGY

## 2025-01-30 PROCEDURE — 83090 ASSAY OF HOMOCYSTEINE: CPT | Performed by: PSYCHIATRY & NEUROLOGY

## 2025-01-30 PROCEDURE — 80053 COMPREHEN METABOLIC PANEL: CPT | Performed by: PSYCHIATRY & NEUROLOGY

## 2025-01-30 PROCEDURE — 83921 ORGANIC ACID SINGLE QUANT: CPT | Mod: 91 | Performed by: PSYCHIATRY & NEUROLOGY

## 2025-01-30 PROCEDURE — 83735 ASSAY OF MAGNESIUM: CPT | Performed by: PSYCHIATRY & NEUROLOGY

## 2025-01-30 PROCEDURE — 83520 IMMUNOASSAY QUANT NOS NONAB: CPT | Performed by: PSYCHIATRY & NEUROLOGY

## 2025-02-01 LAB — VIT B12 SERPL-MCNC: 197 NG/L (ref 180–914)

## 2025-02-03 ENCOUNTER — TELEPHONE (OUTPATIENT)
Facility: CLINIC | Age: 80
End: 2025-02-03
Payer: MEDICARE

## 2025-02-03 LAB
IMMUNOLOGIST REVIEW: ABNORMAL
M PHOSPHO-TAU 217: 0.73 PG/ML

## 2025-02-03 NOTE — TELEPHONE ENCOUNTER
Called and spoke with patient. Informed that lab results are not available yet for MD to go over results. Patient is also informed that once lab results become available, clinic may reach out to schedule an appointment to go over results.    Patient verbally understood.

## 2025-02-03 NOTE — TELEPHONE ENCOUNTER
----- Message from Kelin sent at 2/3/2025  2:12 PM CST -----  Type:  Test Results    Who Called: CHAYO CALABRESE [[id number]]    Would the patient rather a call back or a response via [facility]? Call back     Best Call Back Number: [phone]     Additional Information:  Patient states he would like to discuss blood work he had recently with the provider. Patient would like a call back with further assistance.

## 2025-02-04 ENCOUNTER — TELEPHONE (OUTPATIENT)
Dept: NEUROLOGY | Facility: CLINIC | Age: 80
End: 2025-02-04
Payer: MEDICARE

## 2025-02-04 LAB
ANNOTATION COMMENT IMP: NORMAL
GASTRIN P FAST SERPL-MCNC: 64 PG/ML
GFAP, PLASMA: 122 PG/ML (ref 0–186)
IF BLOCK AB SER QL: NEGATIVE
LC PHOSPHO-TAU (181P): 3.46 PG/ML (ref 0–0.97)
METHYLMALONATE SERPL-SCNC: 0.44 NMOL/ML
METHYLMALONATE SERPL-SCNC: 0.53 UMOL/L
NEUROFILAMENT LIGHT CHAIN, PLASMA: 29.1 PG/ML
VIT B1 BLD-MCNC: 72 UG/L (ref 38–122)

## 2025-02-04 NOTE — TELEPHONE ENCOUNTER
Called and spoke with Pt in regards to scheduling a vv with Dr Arguello to discuss lab results - per Saundra request, Pt declined 2/18 at 11:45 appt slot and the next available appt was 3/11 at 13:00.

## 2025-02-10 RX ORDER — ATENOLOL 25 MG/1
TABLET ORAL
Qty: 90 TABLET | Refills: 3 | Status: SHIPPED | OUTPATIENT
Start: 2025-02-10

## 2025-02-19 ENCOUNTER — OFFICE VISIT (OUTPATIENT)
Dept: CARDIOLOGY | Facility: CLINIC | Age: 80
End: 2025-02-19
Payer: MEDICARE

## 2025-02-19 VITALS — WEIGHT: 165.81 LBS | HEART RATE: 54 BPM | OXYGEN SATURATION: 93 % | BODY MASS INDEX: 21.87 KG/M2

## 2025-02-19 DIAGNOSIS — I25.10 ATHEROSCLEROSIS OF NATIVE CORONARY ARTERY OF NATIVE HEART WITHOUT ANGINA PECTORIS: Primary | ICD-10-CM

## 2025-02-19 DIAGNOSIS — E78.00 HYPERCHOLESTEREMIA: ICD-10-CM

## 2025-02-19 DIAGNOSIS — I50.32 CHRONIC DIASTOLIC HEART FAILURE: ICD-10-CM

## 2025-02-19 DIAGNOSIS — I10 PRIMARY HYPERTENSION: ICD-10-CM

## 2025-02-19 PROCEDURE — 99213 OFFICE O/P EST LOW 20 MIN: CPT | Mod: PBBFAC | Performed by: INTERNAL MEDICINE

## 2025-02-19 RX ORDER — MEMANTINE HYDROCHLORIDE 10 MG/1
10 TABLET ORAL DAILY
COMMUNITY

## 2025-02-19 NOTE — PROGRESS NOTES
[facility], [place], operated by [facility] CARDIOLOGY    Chief Complaint  Chief Complaint   Patient presents with    Follow-up       HPI:    Memory continues to decline.  Otherwise doing well.  Reports walking for exercise every day without exertional dyspnea or chest discomfort.  Taking Lasix for lower extremity edema but can not quantitate how frequently he does that.  No syncope or presyncope.    Medications  Current Medications[1]     History  Past Medical History:   Diagnosis Date    Coronary atherosclerosis     Diastolic heart failure     Essential hypertension     Hypercholesteremia     Labyrinthitis     Stroke 2011    diplopia. Neuro-ophtho dx'd possible microstroke    Vertigo      Past Surgical History:   Procedure Laterality Date    CORONARY ANGIOGRAPHY Right 10/26/2022    Procedure: ANGIOGRAM, CORONARY ARTERY;  Surgeon: Adolph Barboza MD;  Location: [facility] CATH LAB;  Service: Cardiology;  Laterality: Right;  right radial    CORONARY STENT PLACEMENT  12/18/2000    prox LAD Tetra 3.5 x 18 mm, mid LAD Tetra 3 x 18 mm    CORONARY STENT PLACEMENT  11/22/2004    prox LCx Cypher 2.5 x 13 mm, OM POBA 2 mm     Social History[2]  Family History   Problem Relation Name Age of Onset    Heart disease Neg Hx      Hypertension Neg Hx          Allergies  Review of patient's allergies indicates:  No Known Allergies    Review of Systems   Review of Systems   Constitutional: Negative for malaise/fatigue, weight gain and weight loss.   Eyes:  Negative for visual disturbance.   Cardiovascular:  Positive for leg swelling. Negative for chest pain, claudication, cyanosis, dyspnea on exertion, irregular heartbeat, near-syncope, orthopnea, palpitations, paroxysmal nocturnal dyspnea and syncope.   Respiratory:  Negative for cough, hemoptysis, shortness of breath, sleep disturbances due to breathing and wheezing.    Hematologic/Lymphatic: Negative for bleeding problem. Does not bruise/bleed easily.   Skin:  Negative for poor wound healing.   Musculoskeletal:   Negative for falls, muscle cramps and myalgias.   Gastrointestinal:  Negative for abdominal pain, anorexia, diarrhea, heartburn, hematemesis, hematochezia, melena, nausea and vomiting.   Genitourinary:  Negative for hematuria and nocturia.   Neurological:  Negative for excessive daytime sleepiness, dizziness, focal weakness, light-headedness and weakness.       Physical Exam  Vitals:    02/19/25 1552   BP: (!) (P) 146/70   Pulse: (!) 54     Wt Readings from Last 1 Encounters:   02/19/25 75.2 kg (165 lb 12.8 oz)     Physical Exam  Vitals and nursing note reviewed.   Constitutional:       General: He is not in acute distress.     Appearance: He is not toxic-appearing or diaphoretic.   HENT:      Head: Normocephalic and atraumatic.      Mouth/Throat:      Lips: Pink.      Mouth: Mucous membranes are moist.   Eyes:      General: No scleral icterus.     Conjunctiva/sclera: Conjunctivae normal.   Neck:      Thyroid: No thyromegaly.      Vascular: No carotid bruit, hepatojugular reflux or JVD.      Trachea: Trachea normal.   Cardiovascular:      Rate and Rhythm: Normal rate and regular rhythm. No extrasystoles are present.     Chest Wall: PMI is not displaced.      Pulses:           Carotid pulses are 2+ on the right side and 2+ on the left side.       Radial pulses are 2+ on the right side and 2+ on the left side.        Dorsalis pedis pulses are 2+ on the right side and 2+ on the left side.        Posterior tibial pulses are 2+ on the right side and 2+ on the left side.      Heart sounds: S1 normal and S2 normal. No murmur heard.     No friction rub. No S3 or S4 sounds.   Pulmonary:      Effort: Pulmonary effort is normal. No tachypnea, bradypnea, accessory muscle usage or respiratory distress.      Breath sounds: Normal breath sounds and air entry. No decreased breath sounds, wheezing, rhonchi or rales.   Abdominal:      General: Bowel sounds are normal. There is no distension or abdominal bruit.      Palpations:  Abdomen is soft. There is no hepatomegaly, splenomegaly or pulsatile mass.      Tenderness: There is no abdominal tenderness.   Musculoskeletal:         General: No tenderness or deformity.      Right lower le+ Edema present.      Left lower le+ Edema present.   Skin:     General: Skin is warm and dry.      Capillary Refill: Capillary refill takes less than 2 seconds.      Coloration: Skin is not cyanotic or pale.      Nails: There is no clubbing.   Neurological:      General: No focal deficit present.      Mental Status: He is alert and oriented to person, place, and time.   Psychiatric:         Attention and Perception: Attention normal.         Mood and Affect: Mood normal.         Speech: Speech normal.         Behavior: Behavior normal. Behavior is cooperative.         Labs  Lab Visit on 2025   Component Date Value Ref Range Status    Folate 2025 11.0  4.0 - 24.0 ng/mL Final    Vitamin B-12 2025 197  180 - 914 ng/L Final    Comment: B-12 <400; MMA test was performed.    Test Performed by:  [facility]  [address] [place] [address]  : Flakita Richardson Ph.D.; CLIA# [id number]      Thiamine 2025 72  38 - 122 ug/L Final    Comment: This test was developed and the performance   characteristics determined by [facility].   It has not been cleared or approved by the FDA.   The laboratory is regulated under CLIA as qualified to   perform high-complexity testing. This test is used for   patient testing purposes. It should not be regarded   as investigational or for research.    Test performed at [facility],  [address] , [place]  [address]     [phone]  Rina Gutierrez MD, PhD - Medical Director      TSH 2025 0.837  0.400 - 4.000 uIU/mL Final    Treponema Pallidum Antibodies (IgG* 2025 Nonreactive  Nonreactive Final    T4, Total 2025 5.5  4.5 - 11.5 ug/dL Final     Phospho-Tau (181P) 01/30/2025 3.46 (H)  0.00 - 0.97 pg/mL Final    Comment: Results greater than the clinical cut-off of  0.97 pg/mL in patients greater than 55 years  of age are correlated with Abeta amyloid  pathology as determined by amyloid PET  imaging.  * Test  performed by [facility]  Electrochemiluminescence Immunoassay  (ECLIA). Values obtained with different  methods cannot be used interchangeable.  This test was developed and its performance  characteristics determined by [facility]. It  has not been cleared or approved  by the Food and Drug Administration.  Performed at:  [address]  [id number]  : Abhilash Root MD, Phone:  [phone]      Neurofilament Light Chain, Plasma 01/30/2025 29.1  <=47.8 pg/mL Final    Comment: -------------------ADDITIONAL INFORMATION-------------------  The testing method is a digital immunoassay for the   quantitative determination of NfL in plasma manufactured by   [employer] and performed on the [employer] HD-X   analyzer.    Values obtained with different methods may be different and   cannot be used interchangeably.  This test was developed and its performance characteristics   determined by [facility] in a manner consistent with CLIA   requirements. This test has not been cleared or approved by   the U.S. Food and Drug Administration.    Test Performed by:  [facility]  [address] [place] [address]  : Flakita Richardson Ph.D.; CLIA# [id number]      Methlymalonic Acid 01/30/2025 0.53 (H)  <0.40 umol/L Final    Comment: If applicable, any drug confirmation testing reported  here was developed and the performance characteristics  determined by [facility]. This   confirmation testing has not been cleared or approved  by the FDA. The laboratory is regulated under CLIA as  qualified to perform high-complexity testing. This  test  is used for patient testing purposes. It should not be  regarded as investigational or for research.    Test performed at [place],  [address], [place]  [address]     [phone]  Rina Gutierrez MD, PhD - Medical Director      IgG 01/30/2025 991  650 - 1600 mg/dL Final    IgG Cord Blood Reference Range: 650-1600 mg/dL.    Homocysteine 01/30/2025 21.2 (H)  4.0 - 16.5 umol/L Final    Sodium 01/30/2025 139  136 - 145 mmol/L Final    Potassium 01/30/2025 4.3  3.5 - 5.1 mmol/L Final    Chloride 01/30/2025 105  95 - 110 mmol/L Final    CO2 01/30/2025 25  23 - 29 mmol/L Final    Glucose 01/30/2025 106  70 - 110 mg/dL Final    BUN 01/30/2025 18  8 - 23 mg/dL Final    Creatinine 01/30/2025 1.3  0.5 - 1.4 mg/dL Final    Calcium 01/30/2025 8.9  8.7 - 10.5 mg/dL Final    Total Protein 01/30/2025 7.3  6.0 - 8.4 g/dL Final    Albumin 01/30/2025 4.2  3.5 - 5.2 g/dL Final    Total Bilirubin 01/30/2025 1.0  0.1 - 1.0 mg/dL Final    Comment: For infants and newborns, interpretation of results should be based  on gestational age, weight and in agreement with clinical  observations.    Premature Infant recommended reference ranges:  Up to 24 hours.............<8.0 mg/dL  Up to 48 hours............<12.0 mg/dL  3-5 days..................<15.0 mg/dL  6-29 days.................<15.0 mg/dL      Alkaline Phosphatase 01/30/2025 80  40 - 150 U/L Final    AST 01/30/2025 27  10 - 40 U/L Final    ALT 01/30/2025 21  10 - 44 U/L Final    eGFR 01/30/2025 54.8 (A)  >60 mL/min/1.73 m^2 Final    Anion Gap 01/30/2025 9  8 - 16 mmol/L Final    WBC 01/30/2025 5.84  3.90 - 12.70 K/uL Final    RBC 01/30/2025 3.95 (L)  4.60 - 6.20 M/uL Final    Hemoglobin 01/30/2025 12.3 (L)  14.0 - 18.0 g/dL Final    Hematocrit 01/30/2025 37.6 (L)  40.0 - 54.0 % Final    MCV 01/30/2025 95  82 - 98 fL Final    MCH 01/30/2025 31.1 (H)  27.0 - 31.0 pg Final    MCHC 01/30/2025 32.7  32.0 - 36.0 g/dL Final    RDW 01/30/2025 12.7  11.5 - 14.5 %  Final    Platelets 01/30/2025 175  150 - 450 K/uL Final    MPV 01/30/2025 11.4  9.2 - 12.9 fL Final    Immature Granulocytes 01/30/2025 0.2  0.0 - 0.5 % Final    Gran # (ANC) 01/30/2025 3.9  1.8 - 7.7 K/uL Final    Immature Grans (Abs) 01/30/2025 0.01  0.00 - 0.04 K/uL Final    Comment: Mild elevation in immature granulocytes is non specific and   can be seen in a variety of conditions including stress response,   acute inflammation, trauma and pregnancy. Correlation with other   laboratory and clinical findings is essential.      Lymph # 01/30/2025 1.3  1.0 - 4.8 K/uL Final    Mono # 01/30/2025 0.4  0.3 - 1.0 K/uL Final    Eos # 01/30/2025 0.1  0.0 - 0.5 K/uL Final    Baso # 01/30/2025 0.03  0.00 - 0.20 K/uL Final    nRBC 01/30/2025 0  0 /100 WBC Final    Gran % 01/30/2025 67.2  38.0 - 73.0 % Final    Lymph % 01/30/2025 22.9  18.0 - 48.0 % Final    Mono % 01/30/2025 7.0  4.0 - 15.0 % Final    Eosinophil % 01/30/2025 2.2  0.0 - 8.0 % Final    Basophil % 01/30/2025 0.5  0.0 - 1.9 % Final    Differential Method 01/30/2025 Automated   Final    GFAP, Plasma 01/30/2025 122.00  0.00 - 186.00 pg/mL Final    Comment: This test was developed and its performance  characteristics determined by [facility]. It  has not been cleared or approved  by the Food and Drug Administration.  Performed at:  [address]  [id number]  : Abhilash Root MD, Phone:  [phone]      Magnesium 01/30/2025 2.0  1.6 - 2.6 mg/dL Final    M Phospho-Tau 217 01/30/2025 0.732 (H)  pg/mL Final    Comment: -------------------REFERENCE VALUE--------------------------  Negative: < or = 0.185 pg/mL  Intermediate: 0.186 - 0.324 pg/mL  Positive: > or = 0.325 pg/mL      M p Tau 217 Interpretation 01/30/2025 SEE BELOW   Final    Comment: An elevated (positive) [id number] result is consistent   with a positive (abnormal) amyloid positron   emission tomography (PET) scan result. This result   is consistent with the  presence of neuropathological   changes associated with Alzheimer's disease. In the   proper clinical context, this test is supportive of   Alzheimer's disease being related to current   clinical symptoms. This test has not been   demonstrated to provide information on the risk of   an asymptomatic individual developing symptoms   related to Alzheimer's disease in the future.    Clinical performance of this test was established   in a study of 427 individuals, 50 years and older,   with mild cognitive impairment or early dementia.   The prevalence of amyloid pathology was 64% as   defined by amyloid-PET and a Centiloid of   > or = 25. For detection of an abnormal amyloid-  PET, the test sensitivity at the lower cutpoint   (< or = 0.185 pg/mL) was 92% and the specificity   at the upper cutpoint (> or = 0.325 pg/mL                           ) was 96%.    The diagnostic performance of this test has not   been established in asymptomatic individuals.   Elevations of [id number] may be seen in individuals   with impaired kidney function associated with   chronic kidney disease and should be interpreted   with caution in these situations.      -------------------ADDITIONAL INFORMATION-------------------  This test was developed and its performance   characteristics determined by [facility] in a   manner consistent with CLIA requirements. This test   has not been cleared or approved by the U.S. Food   and Drug Administration.     The testing method is a chemiluminescent enzyme   immunoassay manufactured by [employer] and   performed on the [employer] analyzer. Values   obtained with different assay methods or kits may   be different and cannot be used interchangeably.     This test is not intended as a screening or   standalone diagnostic assay; correlation with   clinical findings is recommended.     Test Performed by:  [facility]  [address] [place] [address]  : Flakita Richardson Ph.D.; CLIA# [id number]      B12 Def. Methylmalonic Acid 01/30/2025 0.44 (H)  <=0.40 nmol/mL Final    Comment: Intrinsic Factor Blocking Antibody was performed.    In this sample, the concentration of methylmalonic acid   (MMA) was minimally elevated. As the upper limit of the   reference range varies in different laboratories from 0.4   to 0.6 nmol/mL. This finding could be considered normal,   especially if the patient does not show other signs of   vitamin B12 deficiency.    -------------------ADDITIONAL INFORMATION-------------------  This test was developed and its performance characteristics   determined by [facility] in a manner consistent with CLIA   requirements. This test has not been cleared or approved by   the U.S. Food and Drug Administration.    Test Performed by:  [place] [address]  : Flakita Richardson Ph.D.; CLIA# [id number]      Intrinsic Factor 01/30/2025 Negative  Negative Final    Gastrin test was performed.    AIF Comment 01/30/2025 SEE BELOW   Final    Comment: Intrinsic Factor Blocking Antibody (IFBA) antibodies are   absent in approximately 50% of individuals with pernicious   anemia (PA). The absence of elevated IFBA antibodies does   not rule out the presence of PA; further studies such as   gastrin testing may be indicated.    Test Performed by:  [place] [address]  : Flakita Richardson Ph.D.; CLIA# [id number]      Gastrin 01/30/2025 64  pg/mL Final    Comment: Not consistent with pernicious anemia.    -------------------REFERENCE VALUE--------------------------  <100  Reference ranges valid for   >= 8 hour fast.    Test Performed by:  [place] [address]  : Flakita KIMBALL  Debra Ph.D.; CLIA# [id number]         Imaging  No results found.    Assessment  1. Atherosclerosis of native coronary artery of native heart without angina pectoris   Stable over 20 years since his last stent    2. Chronic diastolic heart failure  Compensated.  Self titrating Lasix    3. Primary hypertension  Controlled    4. Hypercholesteremia  Controlled      Plan and Discussion    No change to present cardiac management    The ASCVD Risk score (Taylor DK, et al., 2019) failed to calculate for the following reasons:    The 2019 ASCVD risk score is only valid for ages 40 to 79    Risk score cannot be calculated because patient has a medical history suggesting prior/existing ASCVD     Follow Up  Follow up in about 6 months (around 8/19/2025).      Adolph Barboza MD         [1]   Current Outpatient Medications   Medication Sig Dispense Refill    aspirin 81 mg Cap Take 81 mg by mouth once daily.      atenoloL (TENORMIN) 25 MG tablet TAKE ONE TABLET BY MOUTH EVERY DAY 90 tablet 3    atorvastatin (LIPITOR) 20 MG tablet TAKE ONE TABLET BY MOUTH EVERY EVENING 90 tablet 3    furosemide (LASIX) 40 MG tablet TAKE ONE TABLET BY MOUTH EVERY DAY AS NEEDED FOR FLUID 30 tablet 3    lisinopriL (PRINIVIL,ZESTRIL) 20 MG tablet Take 20 mg by mouth once daily.      montelukast (SINGULAIR) 10 mg tablet Take 10 mg by mouth daily as needed.      tamsulosin (FLOMAX) 0.4 mg Cap Take 0.4 mg by mouth once daily.      meclizine (ANTIVERT) 25 mg tablet Take 25 mg by mouth daily as needed. (Patient not taking: Reported on 2/19/2025)      memantine (NAMENDA) 10 MG Tab Take 10 mg by mouth once daily. Patient takes 1/2 tablet twice daily.       No current facility-administered medications for this visit.   [2]   Social History  Socioeconomic History    Marital status:    Tobacco Use    Smoking status: Former     Types: Cigars    Smokeless tobacco: Never   Substance and Sexual Activity    Alcohol use: Yes     Social Drivers of Health      Financial Resource Strain: Low Risk  (1/28/2025)    Overall Financial Resource Strain (CARDIA)     Difficulty of Paying Living Expenses: Not very hard   Food Insecurity: Patient Declined (1/28/2025)    Hunger Vital Sign     Worried About Running Out of Food in the Last Year: Patient declined     Ran Out of Food in the Last Year: Patient declined   Physical Activity: Sufficiently Active (1/28/2025)    Exercise Vital Sign     Days of Exercise per Week: 5 days     Minutes of Exercise per Session: 30 min   Stress: Stress Concern Present (1/28/2025)    [employer] - Occupational Stress Questionnaire     Feeling of Stress : To some extent   Housing Stability: Unknown (1/28/2025)    Housing Stability Vital Sign     Unable to Pay for Housing in the Last Year: Patient declined

## 2025-03-11 ENCOUNTER — OFFICE VISIT (OUTPATIENT)
Facility: CLINIC | Age: 80
End: 2025-03-11
Payer: MEDICARE

## 2025-03-11 DIAGNOSIS — G30.1 MILD LATE ONSET ALZHEIMER'S DEMENTIA WITH OTHER BEHAVIORAL DISTURBANCE: Primary | ICD-10-CM

## 2025-03-11 DIAGNOSIS — I68.0 CEREBRAL AMYLOID ANGIOPATHY: ICD-10-CM

## 2025-03-11 DIAGNOSIS — E85.4 CEREBRAL AMYLOID ANGIOPATHY: ICD-10-CM

## 2025-03-11 DIAGNOSIS — E53.8 B12 DEFICIENCY: ICD-10-CM

## 2025-03-11 DIAGNOSIS — E71.120 METHYLMALONIC ACIDEMIA: ICD-10-CM

## 2025-03-11 DIAGNOSIS — F02.A18 MILD LATE ONSET ALZHEIMER'S DEMENTIA WITH OTHER BEHAVIORAL DISTURBANCE: Primary | ICD-10-CM

## 2025-03-11 DIAGNOSIS — G31.9 NEURODEGENERATIVE COGNITIVE IMPAIRMENT: ICD-10-CM

## 2025-03-11 DIAGNOSIS — R79.83 HOMOCYSTEINEMIA: ICD-10-CM

## 2025-03-11 DIAGNOSIS — Z15.89 APOE*3/*4 GENOTYPE: ICD-10-CM

## 2025-03-11 DIAGNOSIS — G20.C PARKINSONISM, UNSPECIFIED PARKINSONISM TYPE: ICD-10-CM

## 2025-03-11 RX ORDER — VITAMIN B COMPLEX
1 CAPSULE ORAL DAILY
Qty: 30 CAPSULE | Refills: 1 | Status: SHIPPED | OUTPATIENT
Start: 2025-03-11

## 2025-03-11 RX ORDER — VIT C/E/ZN/COPPR/LUTEIN/ZEAXAN 250MG-90MG
CAPSULE ORAL
Qty: 30 TABLET | Refills: 3 | Status: SHIPPED | OUTPATIENT
Start: 2025-03-11

## 2025-03-11 NOTE — PROGRESS NOTES
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 others.  The patient experiences challenges with multi-step processes that require planning, sequencing, and follow-through, impacting their ability to manage complex tasks independently.  The patient does not have difficulty with recognizing and adjusting for mistakes.  The patient has not shown to have difficulty with judgement.  The patient does not have difficulty understanding abstract concepts or relationship.  The patient's insight into their health and situation is intact.  Language  The patient's speech has been not affected.  The patient's speech is fluent and non-effortful.  The patient does forget places/people's names more frequently.  The patient does have word-finding difficulties.  The patient does not make word substitutions.  The patient's speech is grammatically intact.  The patient does not appear to have impaired sentence fluency, repetition,and phonological processing.  The patient does not appear to have impaired comprehension.  The patient does not appear to have difficulty comprehending and understanding written text.  Visuospatial  The patient has become confused or disoriented in new, unfamiliar places.  The patient does have trouble navigating.  The patient does not get lost in familiar  places.  The patient does not have visuospatial disorientation.  The patient denies problems with driving or parking.  The patient does have difficulty recognizing objects or faces.  Motor/Coordination  The patient does have difficulty with walking.  The patient does feel imbalanced.  The patient has fallen.  The patient does appear to have new motor deficits.  The patient does not have difficulty buttoning shirts, operating zippers, or manipulating tools/utensils.  The patient reports new slow, small dysrhythmic movement.  The patient does not have a resting tremor.  The patient's handwriting has not become micrographic.  The patient denies restlessness.  The patient denies new and/or worsening simple repetitive behaviors.  The patient denies a change of self-stimulating behavior.  The patient's speech has not become simplified or become repetitive/stereotyped.  The patient denies having any new involuntary movements and/or muscle jerking.  The patient denies new muscle cramps and twitching.  The patient does not have swallowing difficulty.  Sensory  The patient denies new numbness, tingling, paresthesias, or pain.  The patient reports new loss of vision, blurry vision, and/or double vision.  The patient reports a recent loss of hearing and/or worsening tinnitus.  The patient denies anosmia.  The patient does not have hypersensitivity to environmental stimuli.  The patient does not have severe hypersensitivity to environmental stimuli.  Sleep  The patient denies difficulty sleeping.  The patient does not have difficulty going to sleep.  The patient denies difficulty staying asleep or frequently awakening at night.  The patient reports snoring.  The patient does not snore or have witnessed apneas while sleeping.  When the patient wakes up in the morning, the patient does feel well-rested.  The patient denies dream-enactment behavior.  The patient denies per-nocturnal jerking.  The patient denies symptoms suggestive  of restless leg syndrome.  Neurobehavioral  The patient's personality has not changed.  The patient does note have difficulty with self-control.  The patient denies new impulsivity or rash/careless actions.  The patient does not appear to have a change in inertia.  The patient does not appear apathetic or has decreased motivation.  The patient does not have difficulty with understanding and responding to social cues.  The patient is not exhibiting a diminished response to other people's needs and feelings.  The patient is not exhibiting symptoms of social withdrawal/indifference.  The patient is not exhibiting a diminished social interest, interrelatedness, or personal warmth.  The patient does not have symptoms to suggest a loss of manners or decorum.  The patient does not have symptoms of disinhibition and social inappropriateness.  The patient's personal hygiene is intact.  The patient does not have difficulty with flexible thinking.  The patient denies any change in rigid behavior.  The patient does not have symptoms of hyper-religiosity or dogmatism.  The patient's interests/pleasures have not become restrictive, simplified, interrupting, or repetitive.  The patient has not shown new perseverative behavior.  The patient denies new/worsening complex repetitive/ritualistic compulsions and behaviors.  The patient denies any changes in eating behavior.  The patient denies increased consumption of food or substances.  The patient denies oral exploration or consumption of inedible objects.  Psychiatric  The patient does have difficulty with managing emotional responses effectively.  The patient does have symptoms of irritability and mood lability.  The patient does not exhibit hyperactive behavior.  The patient does not have symptoms of agitation, aggression, or violent outbursts.  The patient's emotional expression has changed.  The patient does have emotional blunting.  The patient does not feel depressed.  The  patient denies anxiety.  The patient does not exhibit cycling behavior.  The patient is not exhibited symptoms of paranoia.  The patient does not have delusions.  The patient does not have hallucinations.  Medical Review of Systems  The patient does not have constipation.  The patient does not have diarrhea.  The patient does not have urinary incontinence.  The patient denies orthostatic lightheadedness.  The patient's weight is unstable. Comment: 40 lbs between [id number]  The patient does not have a history of sensitivity to neuroleptic/psychotropic medications.            Neurological Examination:     Mental Status  The patient's appearance is normal (hygiene is appropriate; attire is proper and clean).  Throughout the interview, the patient is cooperative, the patient's eye contact is appropriate.  The patient behavior is appropriate to the clinical context without impropriety or improper language/conduct.  The patient's energy level is abnormal.  The patient's orientation is not entirely accurate.  The patient's attention/concentration is impaired.  The patient is unable to complete three-step commands without making errors.  The patient fund of knowledge was appropriate for age, culture, and level of education.  The patient's thought process is logical and goal-oriented.  The patient demonstrated impaired insight based on actions, awareness of the patient's illness, plans for the future.  The patient demonstrated impaired judgment based on actions and plans for the future.  Cranial Nerves  The patient showed no evidence of anosmia 3/3 (coffee/vanilla/cinnamon).  The patient's pupils were normal.  The patient's visual fields were full to confrontation in all quadrants.  The patient's ocular pursuit was impaired.  The patient's saccadic initiation, velocity, and amplitude are normal.  The patient demonstrated no square-wave jerks.  The patient's eyelid assessment showed abnormalities.  The patient blink rate was  "abnormal.  The patient's facial strength was normal.  The patient's facial expression was abnormal.  The patient's facial sensation was intact to light touch bilaterally.  The patient's hearing was normal bilaterally.  The patient's oropharynx and soft palate appeared abnormal.  The patient's uvula is mid-line.  The patient's tongue showed no evidence of scalloping.  The patient can protrude their tongue beyond The patient's lips for >10 sec.  The patient can move their extended tongue back and forth rapidly.  The patient's tongue showed no evidence of fasciculation or scalloping.  The patient's sternocleidomastoid and trapezius muscle strength was full bilaterally.  The patient had no significant evidence of anterocollis or retrocollis.  Speech/Language  The patient's speech was fluent, non-effortful, and the patient's rate was appropriate to the context.  The patient's speech volume is within normal range and appropriate to the context.  The patient's speech rate is normal.  The patient's respirations are within normal range and appropriate to context.  The patient's speech timbre is normal.  The patient has no articulation (segmental features) errors.  The patient has no speech dysdiadochokinesia with repetition of syllables such as "/PA/, /TA/, /KA/, /OM/".  The patient's pitch assessment showed normal range, intonation (Pitch Pattern), and variability.  The patient's tone was not emotionally limited, and tempo was rhythmic (e.g., "Once upon a midnight dreary, while I pondered, weak and weary, Over many a quaint and curious volume of forgotten celestino" and "That government of the people, by the people, for the people, shall not perish from the earth").  The patient's speech is not dysarthric.  The patient's speech was without evidence of anomia.  The patient makes no phonological loop errors.  The patient's speech is grammatically intact; (no function/semantic word substitutions, phonemic/semantic paraphasias, or " binary confusion).  Motor  The patient's bilateral upper extremity muscle bulk is appropriate.  The patient's upper extremity muscle tone is increased.  The patient's bilateral upper extremity muscle tone does not suggest spasticity.  There is evidence of rigidity/cogwheeling.  There is evidence of paratonia.  There was no dystonia observed on examination.  Assessment of motor strength was symmetric and at minimal anti-gravity.  Deltoid L +5/5 R +5/5 Biceps L +5/5 R +5/5 Triceps L +5/5 R +5/5 Wrist extension L +5/5 R +5/5 Finger abduction L +5/5 R +5/5 Hip flexion L +5/5 R +5/5 Hip extension L +5/5 R +5/5 Knee flexion L +5/5 R +5/5 Knee extension L +5/5 R +5/5 Ankle flexion L +5/5 R +5/5 Ankle extension L +5/5 R +5/5  There is no pronator or downward drift.  There is no myoclonus observed in the patient bilateral upper and lower extremities.  There are no fasciculations observed in the patient bilateral upper and lower extremities.  Coordination  The patient has no bilateral upper extremity limb dysmetria or past pointing on finger-nose-finger bilaterally.  The patient has no bilateral lower extremity limb dysmetria during shin rub.  The patient has no limb dysdiadochokinesia of the upper extremity on the pronation/supination test and screwing in a light bulb or lower extremity during tapping ball of each foot bilaterally.  The patient has no cerebellar rebound bilaterally.  The patient has a visible tremor.  The patient has no kinetic tremor bilaterally.  The patient has no postural tremor bilaterally.  The patient has a resting tremor.  The patient has evidence of interhemispheric motor control deficits.  The patient demonstrates evidence of motor overflow.  The patient demonstrates no alien limb phenomena.  The patient has dyskinetic movements.  The patient has no akathisia.  The patient's upper extremity fine motor coordination was abnormal.  Higher Cortical Function  The patient had no hemineglect (e.g., line  bisection/Samy's test).  The patient showed no evidence of simultanagnosia (Navon hierarchical letters).  The patient showed evidence of visuospatial constructional dysfunction.  The patient showed no evidence of agnosia.  The patient showed no evidence of angular gyrus disconnection (insular-operculum).  The patient has no evidence of dysgraphia.  The patient showed no evidence of apraxia.  The patient showed no dysexecutive behavior.  Sensory  The patient's cortical sensory assessment demonstrated no neglect bilaterally.  The patient's sensation was diminished to light touch, and vibratory sense.  Reflexes  Reflexes were symmetric and 2+ at biceps, 2+ triceps, and 2+ brachioradialis, 2+ at the knees bilaterally, there was no cross-abductor sign, 2+ in the bilateral ankles.  There were no pathological reflexes; No Babinski, bilateral plantar toes go down, no Jaw Jerk Reflex, No Cervantes, No Palmomental reflex, and No Palmar Grasp reflex.  There was no spreading/clonus.  Gait  The patient has normal posture sitting unaided.  The patient is unable to rise from a chair and sit back down without using their arms.  The patient's gait was abnormal.  The patient's posture while walking is abnormal.  The patient's gait initiation/inhibition was normal.  The patient's stance while walking is abnormal.  The patient's gait speed was abnormal (70-80 F 1.13 m/s M 1.26 m/s, >80 F 0.94 m/sec, M 0.97 m/sec).  The patient's stride (gait cycle) was abnormal.  The patient's arms swing is abnormal.  The patient takes turns in >4 steps.  The patient has truncal ataxia.  When attempting to walk abnormally (heels, tiptoes, tandem), the patient makes errors.  While walking on the patient's tiptoes for ten steps, the patient makes deviations.  While walking on the patient's heels for ten steps, the patient makes no deviations.  While walking tandem for ten steps, the patient makes deviations.  While walking with eyes closed for ten steps,  the patient makes deviations.  The patient has evidence of posture/balance impairment.  Retropulsion testing showed abnormal recovery.  The patient has evidence of a specific gait disorder.  The patient has evidence of parkinsonism gait disorder.            Functional Capacity Assessment: Neurological Wellbeing, Intelligence, and Social Evaluation:     Instrumental Activities of Daily Living:   Communal Operations: Moderate level of inability to perform independantly.  Finances: Mild level of inability to perform independantly.  Housework: Normal level of functional independance.  Personal Care: Mild level of inability to perform independantly.  Personal Health: Moderate level of inability to perform independantly.  Recreation: Mild level of inability to perform independantly.  Transportation: Moderate level of inability to perform independantly.  Basic Activities of Daily Living:   Axial Motor: Normal level of functional independance.  Grooming: level of inability to perform independantly.  Hygeine: Normal level of functional independance.  Oropharngeal Motor: Normal level of functional independance.  Personal Health: Normal level of functional independance.  Toiletry: Normal level of functional independance.  Functional Capacity Scales:   IADL: Score 4/8 suggestive of Mild dependence.  FAST: Score 3/16 suggestive of Mild Cognitive Impairment (Stage 3).  FAS: Score 12/30 suggestive of Definite Functional Impairment.  CDR-SOB: Score 4.5/18 suggestive of Prodromal/Very mild dementia.  Global CDR: Score 1/3 suggestive of Prodromal/Very mild dementia.  Memory: 1  Orientation: 0.5  Judgment & Problem Solvin  Community Affairs: 1  Home and Hobbies: 1  Personal Care: 0            Neurocognitive Assessment:     Question Score Interpretation   Memory   Registration T1 (3) 2/3 Borderline Impairment based on age and education.   Registration T1 (5) 2 Within Normal Limits.   Registration T1 (9) 2/9 Severe Impairment: -3.5  STDs below the average score based on age and education.   Registration T2 (9) 4/9 Severe Impairment: -4.3 STDs below the average score based on age and education.   Registration T3 (9) 5/9 Moderate Impairment: -2.2 STDs below the average score based on age and education.   Registration T4 (9) 4/9 Severe Impairment: -5.7 STDs below the average score based on age and education.   Registration T5 (9) 3/9 Severe Impairment: -7.1 STDs below the average score based on age and education.   Executive   Three-step command 3/3 Within Normal Limits.   WORLD Backward 4/5 Borderline Impairment based on age and education.   Digit Span Backwards 5 Within Normal Limits.   Digit Span - 2 2/2 Within Normal Limits.   Fluency 1/1 Within Normal Limits.   Lexical Fluency - F 10 Mild Impairment: -1.3 STDs below the average score based on age and education.   Lexical Fluency - A 10 Mild Impairment: -1.3 STDs below the average score based on age and education.   Lexical Fluency - S 15 Within Normal Limits.   Semantic Fluency - Animals 7 Moderate Impairment: -2.1 STDs below the average score based on age and education.   Semantic Fluency - Vegetables 8 Mild Impairment: -2 STDs below the average score based on age and education.   Trials-1 1/1 Within Normal Limits.   Visuospatial   Clock Draw 2/3 Borderline Impairment based on age and education.   Overlap Images Total 10/10 Within Normal Limits.   Overlap Images (Illusion) 10 Within Normal Limits.   Picture Synthesis 3/3 Within Normal Limits.   Pareidolia Total 20/20 Within Normal Limits.   Pareidolia (+Face) 10/10 Within Normal Limits.   Pareidolia (+No Face) 10/10 Within Normal Limits.   Language   Naming-2 2/2 Within Normal Limits.   Naming-3 3/3 Within Normal Limits.   15-Item BNT 14/15 Within Normal Limits.   Repetition-1 1/1 Within Normal Limits.   Repetition-2 2/2 Within Normal Limits.   Repetition of Phrases 5/5 Within Normal Limits.   Verbal Agility 6/6 Within Normal Limits.    Following written command 1/1 Within Normal Limits.   Writing a complete sentence 1/1 Within Normal Limits.   Repeat & Point - Semantics 10/10 Within Normal Limits.   Abstraction 2/2 Within Normal Limits.   Attention   Orientation-10 10/10 Within Normal Limits.   Orientation-6 6/6 Within Normal Limits.   Digit Span Forwards 7 Within Normal Limits.   Alternating Sequence 1/1 Within Normal Limits.   Social   FTLD Questionnaire  20/20 Within Normal Limits.   Theory of Mind Cartoon 1/1 Within Normal Limits.   Motor/Sensory   Limb-Kinetic Apraxia 10/10 Within Normal Limits.     Clinical Impression of Neurocognitive Assessment: Memory predominant multidomain major cognitive impairment.          Laboratories:     Neurodegenerative Biomarkers - Serum  Name Reference Previous Values   M Phospho-Tau 217 <=0.185pg/mL 0.732 (H)   2025-01-30      Phospho-Tau (181P) <0.97 pg/mL 3.46 (H)   2025-01-30      Neurofilament Light Chain, Plasma <=37.9 pg/mL 29.1   2025-01-30      GFAP, Plasma 0.0 - 186.0 pg/mL 122.00   2025-01-30      Neuro-Nutritional Screening  Name Reference Previous Values   Folate 4.0 - 24.0 ng/mL 11.0   2025-01-30      Vitamin B12 180 - 914 ng/L 197   2025-01-30      Thiamine 38 - 122 ug/L 72   2025-01-30      Homocysteine 4.0 - 15.5 umol/L 21.2 (H)   2025-01-30      B12 Def. Methylmalonic Acid <=0.40 nmol/mL 0.44 (H)   2025-01-30      Neuroendocrine/Electrolyte Screening  Name Reference Previous Values   TSH 0.400 - 4.000 uIU/mL 0.837   2025-01-30      T4 Total 4.5 - 11.5 ug/dL 5.5   2025-01-30      Neuroinfectious Screening  Name Reference Previous Values   Treponema Pallidum Antibodies (IgG, IgM) Nonreactive    2025-01-30      Neuro-Inflammatory Screening - Serum  Name Reference Previous Values   Methlymalonic Acid <0.40 umol/L 0.53 (H)   2025-01-30      Autoimmune Screening - Serum  Name Reference Previous Values   IgG 650 - 1600 mg/dL 991   2025-01-30      Metabolic Screening  Name Reference  Previous Values   Cholesterol Total 120 - 199 mg/dL 133   2022-10-28      Triglycerides 30 - 150 mg/dL 58   2022-10-28      HDL 40 - 75 mg/dL 59   2022-10-28      LDL Calculated 0 - 160 MG/DL 65   2022-10-28                Neurological Relevant Procedures:    Electrocardiogram performed on 07/23/2024  Formal Interpretation: Vent. Rate : 050 BPM Atrial Rate : 050 BPM P-R Int : 214 ms QRS Dur : 130 ms QT Int : 460 ms P-R-T Axes : 063 -55 047 degrees QTc Int : 419 ms  Provider Interpretation: The radiographic interpretation indicates the presence of sinus bradycardia accompanied by a first-degree atrioventricular block. There is also evidence of left axis deviation and left ventricular hypertrophy, which is associated with widening of the QRS complex.            Neurologically Relevant Imaging:    MRI brain/head without contrast performed on 07/17/2024  Radiologist Interpretation: 1. Moderate chronic microvascular ischemic disease. 2. Ventriculomegaly mildly out of proportion with the cerebral atrophy. Correlate with clinical symptomatology. 3. Patchy fluid signal intensity within the right mastoid air cells, which could represent effusion or inflammatory changes of indeterminate age. Correlate clinically.  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 There are scattered subcortical white matter changes and at least three subcortical microbleeds, suggesting a mixed pathology. This may be consistent with neurodegeneration presenting as frontotemporal degeneration and cerebral amyloid angiopathy.  T1-weighted (T1W) Image Summary: The radiographic interpretation indicates mild generalized cortical atrophy, with regional atrophy most evident in the frontotemporal areas. There is notable volume loss in the anterior cingulate and occipital-related sulci.  Additionally, there is pronounced and severe anterior temporal atrophy, which is more significant on the right side compared to the left. Furthermore, there is severe bilateral hippocampal volume loss.  FLAIR/T2-weighted (T2W) Image Summary: The radiographic interpretation reveals a moderate degree of scattered white matter changes, predominantly fluent and subcortical in nature. These changes are more pronounced anteriorly than posteriorly, with periventricular capping observed. There is an indeterminate age right thalamic infarct present. Additionally, white matter hyperintensities are seen ascending and extending across the corpus callosum with a posterior gradient. Secondary infarcts are noted in the bilateral basal ganglia, accompanied by scattered white matter hyperintensities extending up the corona radiata and bilaterally reaching the centrum semiovale.  Diffusion Weighted Imaging with ADC Mapping Summary: There are no significant hyperintensities or hypointensities observed on Diffusion-Weighted Imaging (DWI). Additionally, there is no apparent correlation with the Apparent Diffusion Coefficient (ADC).  Susceptibility-weighted imaging (SWI) and/or GRE Summary: The radiographic interpretation indicates the presence of microbleeds in the right parietal cortex, the left and right subcortical regions of the internal capsule, and the right centrum semiovale.               Billing Statement:       I reviewed documents/diagnostics/laboratory/imaging records and communicated with the patient's family. This is directly related to a face-to-face visit encounter with the patient (Evaluation and Management service).     An Established-Patient Audio Only Telehealth Visit was requested by the provider with the family regarding the workup performed between the patient's last clinical encounter. A total of 45 minutes (from 01:00 PM to 01:45 PM) on 03/09/2025 was spent with the family and/or patient via audio-only  telemedicine discussing the patient's case. The reason for the audio-only service rather than synchronous audio and video virtual visit was related to technical difficulties or patient preference/necessity.   The patient's location is: Home  Visit type: Virtual visit with audio only (telephone)  Each patient to whom I provide medical services by telemedicine is: (1) informed of the relationship between the physician and patient and the respective role of any other health care provider with respect to management of the patient; and (2) notified that they may decline to receive medical services by telemedicine and may withdraw from such care at any time. Patient verbally consented to receive this service via voice-only telephone call.       A Review of Systems was completed, encompassing 10 of the 14 systems. All findings were negative, except those noted in the History of Present Illness (HPI) and Review of Systems (ROS) Sections. The systems reviewed were Constitutional (Const), Eyes, Ear/Nose/Throat (ENT), Respiratory (Resp), Cardiovascular (CV), Gastrointestinal (GI), Genitourinary (), Musculoskeletal (MSK), Skin, and Neurological (Neuro).     I spent a total of 10 minutes to reviewing previous laboratory results on the day of the clinical evaluation. This review was an integral part of the face-to-face encounter. The laboratory findings were predominantly negative, with exceptions as noted in the History of Present Illness (HPI) and Assessment.     I spent a total of 5 minutes reviewing and summarizing records from outside physicians on the day of the clinical evaluation. This review and summary were conducted as described in the History of Present Illness (HPI) and Assessment.     Total Billing time of 60 minutes was spent on documentation and communication. The CPT code(s) for billing for prolonged evaluation and management service (non-face-to-face review of records and/or communications with patient's  family or other medical professionals):   [id number]             This note was dictated using the M*Modal Fluency Direct voice recognition program. Please be aware that word recognition errors may occasionally occur and might be overlooked during review.         Signing Physician:  Nuno Arguello MD

## 2025-03-18 ENCOUNTER — TELEPHONE (OUTPATIENT)
Facility: CLINIC | Age: 80
End: 2025-03-18
Payer: MEDICARE

## 2025-03-18 NOTE — TELEPHONE ENCOUNTER
Called and spoke with Pt in regards to scheduling a skin biopsy - per JR- Pt states he does not want it scheduled at this time and to call back before the end of the week. I shall call back at a later time.

## 2025-03-21 ENCOUNTER — TELEPHONE (OUTPATIENT)
Facility: CLINIC | Age: 80
End: 2025-03-21
Payer: MEDICARE

## 2025-05-12 ENCOUNTER — TELEPHONE (OUTPATIENT)
Facility: CLINIC | Age: 80
End: 2025-05-12
Payer: MEDICARE

## 2025-05-12 NOTE — TELEPHONE ENCOUNTER
Attempted to speak with pt in regards to confirming upcoming skin biopsy with Naomi JOHNSON, on 5/15, left a detailed message stating to pause any blood thinners and to give a call back.

## 2025-05-13 ENCOUNTER — TELEPHONE (OUTPATIENT)
Facility: CLINIC | Age: 80
End: 2025-05-13
Payer: MEDICARE

## 2025-05-13 NOTE — TELEPHONE ENCOUNTER
Called and spoke with Pt in regards to his missed call. Pt confirmed he stopped taking his Asprin in time for the procedure on 5/15 with Naomi JOHNSON, at this time all is well.

## 2025-05-15 ENCOUNTER — PROCEDURE VISIT (OUTPATIENT)
Facility: CLINIC | Age: 80
End: 2025-05-15
Payer: MEDICARE

## 2025-05-15 VITALS
BODY MASS INDEX: 21.73 KG/M2 | HEART RATE: 69 BPM | HEIGHT: 73 IN | SYSTOLIC BLOOD PRESSURE: 135 MMHG | WEIGHT: 163.94 LBS | DIASTOLIC BLOOD PRESSURE: 74 MMHG

## 2025-05-15 DIAGNOSIS — F02.A18 MILD LATE ONSET ALZHEIMER'S DEMENTIA WITH OTHER BEHAVIORAL DISTURBANCE: Primary | ICD-10-CM

## 2025-05-15 DIAGNOSIS — G30.1 MILD LATE ONSET ALZHEIMER'S DEMENTIA WITH OTHER BEHAVIORAL DISTURBANCE: Primary | ICD-10-CM

## 2025-05-15 DIAGNOSIS — G25.81 RESTLESS LEG SYNDROME: ICD-10-CM

## 2025-05-15 PROCEDURE — 11105 PUNCH BX SKIN EA SEP/ADDL: CPT | Mod: S$PBB,,, | Performed by: NURSE PRACTITIONER

## 2025-05-15 PROCEDURE — 11104 PUNCH BX SKIN SINGLE LESION: CPT | Mod: PBBFAC | Performed by: NURSE PRACTITIONER

## 2025-05-15 PROCEDURE — 11105 PUNCH BX SKIN EA SEP/ADDL: CPT | Mod: PBBFAC | Performed by: NURSE PRACTITIONER

## 2025-05-15 PROCEDURE — 11104 PUNCH BX SKIN SINGLE LESION: CPT | Mod: S$PBB,,, | Performed by: NURSE PRACTITIONER

## 2025-05-15 NOTE — PROCEDURES
PRE-OP DIAGNOSIS:  POST-OP DIAGNOSIS: Same   PROCEDURE: skin punch biopsy     Performing Physician: Naomi Weinstein NP  Supervising Physician (if applicable): Nuno Arguello MD.     PROCEDURE:   _  Shave Biopsy  X  Punch Biopsy  _  Incisional Biopsy     DESCRIPTION:  - Punch Size: 0.5 cm  - Number of Punch Biopsies: 3 right side  + CPT 39371 (punch biopsy, neck) 1st procedure  + CPT 64362 (punch biopsy, each additional lesion, thigh) 2nd procedure  + CPT 15450 (punch biopsy, each additional lesion, ankle) 3rd procedure     The area surrounding the skin lesion was prepared and draped in the  usual sterile manner. The lesion was removed in the usual manner by punch biopsy method noted above. Three full thickness cylindrical samples of the skin were removed from the upper back, lower thigh, and lower leg. The intent of the biopsy is to remove a sample of a cutaneous lesion for [facility] diagnostic pathologic examination. Hemostasis was assured. The patient tolerated  the procedure well.     Closure:       _  Monsels for hemostasis                _  Suture   X Simple closure  _ None     Followup: The patient tolerated the procedure well without  complications.  Standard post-procedure care is explained and return  precautions are given.     Pathology/biopsy specimens were sent directly to [facility] for processing. Pathology was not sent via in-house [facility] laboratory. Pathology results will be returned within 4-6 weeks and scanned into [facility] Electronic [facility] Record.

## 2025-06-06 ENCOUNTER — TELEPHONE (OUTPATIENT)
Dept: NEUROLOGY | Facility: CLINIC | Age: 80
End: 2025-06-06
Payer: MEDICARE

## 2025-07-14 NOTE — PROGRESS NOTES
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 staying asleep or frequently awakening at night.  The patient reports snoring.  The patient does not snore or have witnessed apneas while sleeping.  When the patient wakes up in the morning, the patient does feel well-rested.  The patient denies dream-enactment behavior.  The patient denies per-nocturnal jerking.  The patient denies symptoms suggestive of restless leg syndrome.  Neurobehavioral  The patient's personality has not changed.  The patient does note have difficulty with self-control.  The patient denies new impulsivity or rash/careless actions.  The patient does not appear to have a change in inertia.  The patient does not appear apathetic or has decreased motivation.  The patient does not have difficulty with understanding and responding to social cues.  The patient is not exhibiting a diminished response to other people's needs and feelings.  The patient is not exhibiting symptoms of social withdrawal/indifference.  The patient is not exhibiting a diminished social interest, interrelatedness, or personal warmth.  The patient does not have symptoms to suggest a loss of manners or decorum.  The patient does not have symptoms of disinhibition and social inappropriateness.  The patient's personal hygiene is  intact.  The patient does not have difficulty with flexible thinking.  The patient denies any change in rigid behavior.  The patient does not have symptoms of hyper-religiosity or dogmatism.  The patient's interests/pleasures have not become restrictive, simplified, interrupting, or repetitive.  The patient has not shown new perseverative behavior.  The patient denies new/worsening complex repetitive/ritualistic compulsions and behaviors.  The patient denies any changes in eating behavior.  The patient denies increased consumption of food or substances.  The patient denies oral exploration or consumption of inedible objects.  Psychiatric  The patient does have difficulty with managing emotional responses effectively.  The patient does have symptoms of irritability and mood lability.  The patient does not exhibit hyperactive behavior.  The patient does not have symptoms of agitation, aggression, or violent outbursts.  The patient's emotional expression has changed.  The patient does have emotional blunting.  The patient does not feel depressed.  The patient denies anxiety.  The patient does not exhibit cycling behavior.  The patient is not exhibited symptoms of paranoia.  The patient does not have delusions.  The patient does not have hallucinations.  Medical Review of Systems  The patient does not have constipation.  The patient does not have diarrhea.  The patient does not have urinary incontinence.  The patient denies orthostatic lightheadedness.  The patient's weight is unstable. Comment: 40 lbs between [id number]  The patient does not have a history of sensitivity to neuroleptic/psychotropic medications.            Neurological Examination:     Mental Status  The patient's appearance is normal (hygiene is appropriate; attire is proper and clean).  Throughout the interview, the patient is cooperative, the patient's eye contact is appropriate.  The patient behavior is appropriate to the clinical context without  impropriety or improper language/conduct.  The patient's energy level is abnormal.  The patient's orientation is not entirely accurate.  The patient's attention/concentration is impaired.  The patient is unable to complete three-step commands without making errors.  The patient fund of knowledge was appropriate for age, culture, and level of education.  The patient's thought process is logical and goal-oriented.  The patient demonstrated impaired insight based on actions, awareness of the patient's illness, plans for the future.  The patient demonstrated impaired judgment based on actions and plans for the future.  Cranial Nerves  The patient showed no evidence of anosmia 3/3 (coffee/vanilla/cinnamon).  The patient's pupils were normal.  The patient's visual fields were full to confrontation in all quadrants.  The patient's ocular pursuit was impaired.  The patient's saccadic initiation, velocity, and amplitude are normal.  The patient demonstrated no square-wave jerks.  The patient's eyelid assessment showed abnormalities.  The patient blink rate was abnormal.  The patient's facial strength was normal.  The patient's facial expression was abnormal.  The patient's facial sensation was intact to light touch bilaterally.  The patient's hearing was normal bilaterally.  The patient's oropharynx and soft palate appeared abnormal.  The patient's uvula is mid-line.  The patient's tongue showed no evidence of scalloping.  The patient can protrude their tongue beyond The patient's lips for >10 sec.  The patient can move their extended tongue back and forth rapidly.  The patient's tongue showed no evidence of fasciculation or scalloping.  The patient's sternocleidomastoid and trapezius muscle strength was full bilaterally.  The patient had no significant evidence of anterocollis or retrocollis.  Speech/Language  The patient's speech was fluent, non-effortful, and the patient's rate was appropriate to the context.  The  "patient's speech volume is within normal range and appropriate to the context.  The patient's speech rate is normal.  The patient's respirations are within normal range and appropriate to context.  The patient's speech timbre is normal.  The patient has no articulation (segmental features) errors.  The patient has no speech dysdiadochokinesia with repetition of syllables such as "/PA/, /TA/, /KA/, /OM/".  The patient's pitch assessment showed normal range, intonation (Pitch Pattern), and variability.  The patient's tone was not emotionally limited, and tempo was rhythmic (e.g., "Once upon a midnight dreary, while I pondered, weak and weary, Over many a quaint and curious volume of forgotten celestino" and "That government of the people, by the people, for the people, shall not perish from the earth").  The patient's speech is not dysarthric.  The patient's speech was without evidence of anomia.  The patient makes no phonological loop errors.  The patient's speech is grammatically intact; (no function/semantic word substitutions, phonemic/semantic paraphasias, or binary confusion).  Motor  The patient's bilateral upper extremity muscle bulk is appropriate.  The patient's upper extremity muscle tone is increased.  The patient's bilateral upper extremity muscle tone does not suggest spasticity.  There is evidence of rigidity/cogwheeling.  There is evidence of paratonia.  There was no dystonia observed on examination.  Assessment of motor strength was symmetric and at minimal anti-gravity.  Deltoid L +5/5 R +5/5 Biceps L +5/5 R +5/5 Triceps L +5/5 R +5/5 Wrist extension L +5/5 R +5/5 Finger abduction L +5/5 R +5/5 Hip flexion L +5/5 R +5/5 Hip extension L +5/5 R +5/5 Knee flexion L +5/5 R +5/5 Knee extension L +5/5 R +5/5 Ankle flexion L +5/5 R +5/5 Ankle extension L +5/5 R +5/5  There is no pronator or downward drift.  There is no myoclonus observed in the patient bilateral upper and lower extremities.  There are no " fasciculations observed in the patient bilateral upper and lower extremities.  Coordination  The patient has no bilateral upper extremity limb dysmetria or past pointing on finger-nose-finger bilaterally.  The patient has no bilateral lower extremity limb dysmetria during shin rub.  The patient has no limb dysdiadochokinesia of the upper extremity on the pronation/supination test and screwing in a light bulb or lower extremity during tapping ball of each foot bilaterally.  The patient has no cerebellar rebound bilaterally.  The patient has a visible tremor.  The patient has no kinetic tremor bilaterally.  The patient has no postural tremor bilaterally.  The patient has a resting tremor.  The patient has evidence of interhemispheric motor control deficits.  The patient demonstrates evidence of motor overflow.  The patient demonstrates no alien limb phenomena.  The patient has dyskinetic movements.  The patient has no akathisia.  The patient's upper extremity fine motor coordination was abnormal.  Higher Cortical Function  The patient had no hemineglect (e.g., line bisection/Samy's test).  The patient showed no evidence of simultanagnosia (Navon hierarchical letters).  The patient showed evidence of visuospatial constructional dysfunction.  The patient showed no evidence of agnosia.  The patient showed no evidence of angular gyrus disconnection (insular-operculum).  The patient has no evidence of dysgraphia.  The patient showed no evidence of apraxia.  The patient showed no dysexecutive behavior.  Sensory  The patient's cortical sensory assessment demonstrated no neglect bilaterally.  The patient's sensation was diminished to light touch, and vibratory sense.  Reflexes  Reflexes were symmetric and 2+ at biceps, 2+ triceps, and 2+ brachioradialis, 2+ at the knees bilaterally, there was no cross-abductor sign, 2+ in the bilateral ankles.  There were no pathological reflexes; No Babinski, bilateral plantar toes go  down, no Jaw Jerk Reflex, No Cervantes, No Palmomental reflex, and No Palmar Grasp reflex.  There was no spreading/clonus.  Gait  The patient has normal posture sitting unaided.  The patient is unable to rise from a chair and sit back down without using their arms.  The patient's gait was abnormal.  The patient's posture while walking is abnormal.  The patient's gait initiation/inhibition was normal.  The patient's stance while walking is abnormal.  The patient's gait speed was abnormal (70-80 F 1.13 m/s M 1.26 m/s, >80 F 0.94 m/sec, M 0.97 m/sec).  The patient's stride (gait cycle) was abnormal.  The patient's arms swing is abnormal.  The patient takes turns in >4 steps.  The patient has truncal ataxia.  When attempting to walk abnormally (heels, tiptoes, tandem), the patient makes errors.  While walking on the patient's tiptoes for ten steps, the patient makes deviations.  While walking on the patient's heels for ten steps, the patient makes no deviations.  While walking tandem for ten steps, the patient makes deviations.  While walking with eyes closed for ten steps, the patient makes deviations.  The patient has evidence of posture/balance impairment.  Retropulsion testing showed abnormal recovery.  The patient has evidence of a specific gait disorder.  The patient has evidence of parkinsonism gait disorder.            Functional Capacity Assessment: Neurological Wellbeing, Intelligence, and Social Evaluation:     Instrumental Activities of Daily Living:   Communal Operations: Moderate level of inability to perform independantly.  Finances: Mild level of inability to perform independantly.  Housework: Normal level of functional independance.  Personal Care: Mild level of inability to perform independantly.  Personal Health: Moderate level of inability to perform independantly.  Recreation: Mild level of inability to perform independantly.  Transportation: Moderate level of inability to perform  independantly.  Basic Activities of Daily Living:   Axial Motor: Normal level of functional independance.  Grooming: level of inability to perform independantly.  Hygeine: Normal level of functional independance.  Oropharngeal Motor: Normal level of functional independance.  Personal Health: Normal level of functional independance.  Toiletry: Normal level of functional independance.  Functional Capacity Scales:   IADL: Score 4/8 suggestive of Mild dependence.  FAST: Score 3/16 suggestive of Mild Cognitive Impairment (Stage 3).  FAS: Score 12/30 suggestive of Definite Functional Impairment.  CDR-SOB: Score 4.5/18 suggestive of Prodromal/Very mild dementia.  Global CDR: Score 1/3 suggestive of Prodromal/Very mild dementia.  Memory: 1  Orientation: 0.5  Judgment & Problem Solvin  Community Affairs: 1  Home and Hobbies: 1  Personal Care: 0            Neurocognitive Assessment:     Question Score Interpretation   Memory   Registration T1 (3) 2/3 Borderline Impairment based on age and education.   Registration T1 (5) 2 Within Normal Limits.   Registration T1 (9) 2/9 Severe Impairment: -3.5 STDs below the average score based on age and education.   Registration T2 (9) 4/9 Severe Impairment: -4.3 STDs below the average score based on age and education.   Registration T3 (9) 5/9 Moderate Impairment: -2.2 STDs below the average score based on age and education.   Registration T4 (9) 4/9 Severe Impairment: -5.7 STDs below the average score based on age and education.   Registration T5 (9) 3/9 Severe Impairment: -7.1 STDs below the average score based on age and education.   Executive   Three-step command 3/3 Within Normal Limits.   WORLD Backward 4/5 Borderline Impairment based on age and education.   Digit Span Backwards 5 Within Normal Limits.   Digit Span - 2 2/2 Within Normal Limits.   Fluency 1/1 Within Normal Limits.   Lexical Fluency - F 10 Mild Impairment: -1.3 STDs below the average score based on age and  education.   Lexical Fluency - A 10 Mild Impairment: -1.3 STDs below the average score based on age and education.   Lexical Fluency - S 15 Within Normal Limits.   Semantic Fluency - Animals 7 Moderate Impairment: -2.1 STDs below the average score based on age and education.   Semantic Fluency - Vegetables 8 Mild Impairment: -2 STDs below the average score based on age and education.   Trials-1 1/1 Within Normal Limits.   Visuospatial   Clock Draw 2/3 Borderline Impairment based on age and education.   Overlap Images Total 10/10 Within Normal Limits.   Overlap Images (Illusion) 10 Within Normal Limits.   Picture Synthesis 3/3 Within Normal Limits.   Pareidolia Total 20/20 Within Normal Limits.   Pareidolia (+Face) 10/10 Within Normal Limits.   Pareidolia (+No Face) 10/10 Within Normal Limits.   Language   Naming-2 2/2 Within Normal Limits.   Naming-3 3/3 Within Normal Limits.   15-Item BNT 14/15 Within Normal Limits.   Repetition-1 1/1 Within Normal Limits.   Repetition-2 2/2 Within Normal Limits.   Repetition of Phrases 5/5 Within Normal Limits.   Verbal Agility 6/6 Within Normal Limits.   Following written command 1/1 Within Normal Limits.   Writing a complete sentence 1/1 Within Normal Limits.   Repeat & Point - Semantics 10/10 Within Normal Limits.   Abstraction 2/2 Within Normal Limits.   Attention   Orientation-10 10/10 Within Normal Limits.   Orientation-6 6/6 Within Normal Limits.   Digit Span Forwards 7 Within Normal Limits.   Alternating Sequence 1/1 Within Normal Limits.   Social   FTLD Questionnaire  20/20 Within Normal Limits.   Theory of Mind Cartoon 1/1 Within Normal Limits.   Motor/Sensory   Limb-Kinetic Apraxia 10/10 Within Normal Limits.     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          Laboratories:     Neurodegenerative Biomarkers - Serum  Name Reference Previous Values   M Phospho-Tau 217 <=0.185pg/mL 0.732 (H)    2025-01-30      Phospho-Tau (181P) <0.97 pg/mL 3.46 (H)   2025-01-30      Neurofilament Light Chain, Plasma <=37.9 pg/mL 29.1   2025-01-30      GFAP, Plasma 0.0 - 186.0 pg/mL 122.00   2025-01-30      Neuro-Nutritional Screening  Name Reference Previous Values   Folate 4.0 - 24.0 ng/mL 11.0   2025-01-30      Vitamin B12 180 - 914 ng/L 197   2025-01-30      Thiamine 38 - 122 ug/L 72   2025-01-30      Homocysteine 4.0 - 15.5 umol/L 21.2 (H)   2025-01-30      B12 Def. Methylmalonic Acid <=0.40 nmol/mL 0.44 (H)   2025-01-30      Neuroendocrine/Electrolyte Screening  Name Reference Previous Values   TSH 0.400 - 4.000 uIU/mL 0.837   2025-01-30      T4 Total 4.5 - 11.5 ug/dL 5.5   2025-01-30      Neuroinfectious Screening  Name Reference Previous Values   Treponema Pallidum Antibodies (IgG, IgM) Nonreactive    2025-01-30      Neuro-Inflammatory Screening - Serum  Name Reference Previous Values   Methlymalonic Acid <0.40 umol/L 0.53 (H)   2025-01-30      Autoimmune Screening - Serum  Name Reference Previous Values   IgG 650 - 1600 mg/dL 991   2025-01-30      Metabolic Screening  Name Reference Previous Values   Cholesterol Total 120 - 199 mg/dL 133   2022-10-28      Triglycerides 30 - 150 mg/dL 58   2022-10-28      HDL 40 - 75 mg/dL 59   2022-10-28      LDL Calculated 0 - 160 MG/DL 65   2022-10-28      Neurodegenerative Biomarkers - Skin  Name Reference Previous Values   Phosphorylation Alpha-Synuclein - Posterior Cervical (Right) normal Normal   2025-05-15      Phosphorylation Alpha-Synuclein - Distal Thigh (Right) normal Normal   2025-05-15      Phosphorylation Alpha-Synuclein - Distal Leg (Right) normal Normal   2025-05-15                Neurological Relevant Procedures:    Electrocardiogram performed on 07/23/2024  Formal Interpretation: Vent. Rate : 050 BPM Atrial Rate : 050 BPM P-R Int : 214 ms QRS Dur : 130 ms QT Int : 460 ms P-R-T Axes : 063 -55 047 degrees QTc Int : 419 ms  Provider  Interpretation: The radiographic interpretation indicates the presence of sinus bradycardia accompanied by a first-degree atrioventricular block. There is also evidence of left axis deviation and left ventricular hypertrophy, which is associated with widening of the QRS complex.            Neurologically Relevant Imaging:    MRI brain/head without contrast performed on 07/17/2024  Radiologist Interpretation: 1. Moderate chronic microvascular ischemic disease. 2. Ventriculomegaly mildly out of proportion with the cerebral atrophy. Correlate with clinical symptomatology. 3. Patchy fluid signal intensity within the right mastoid air cells, which could represent effusion or inflammatory changes of indeterminate age. Correlate clinically.  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 There are scattered subcortical white matter changes and at least three subcortical microbleeds, suggesting a mixed pathology. This may be consistent with neurodegeneration presenting as frontotemporal degeneration and cerebral amyloid angiopathy.  T1-weighted (T1W) Image Summary: The radiographic interpretation indicates mild generalized cortical atrophy, with regional atrophy most evident in the frontotemporal areas. There is notable volume loss in the anterior cingulate and occipital-related sulci. Additionally, there is pronounced and severe anterior temporal atrophy, which is more significant on the right side compared to the left. Furthermore, there is severe bilateral hippocampal volume loss.  FLAIR/T2-weighted (T2W) Image Summary: The radiographic interpretation reveals a moderate degree of scattered white matter changes, predominantly fluent and subcortical in nature. These changes are more pronounced anteriorly than posteriorly, with periventricular capping observed. There is  an indeterminate age right thalamic infarct present. Additionally, white matter hyperintensities are seen ascending and extending across the corpus callosum with a posterior gradient. Secondary infarcts are noted in the bilateral basal ganglia, accompanied by scattered white matter hyperintensities extending up the corona radiata and bilaterally reaching the centrum semiovale.  Diffusion Weighted Imaging with ADC Mapping Summary: There are no significant hyperintensities or hypointensities observed on Diffusion-Weighted Imaging (DWI). Additionally, there is no apparent correlation with the Apparent Diffusion Coefficient (ADC).  Susceptibility-weighted imaging (SWI) and/or GRE Summary: The radiographic interpretation indicates the presence of microbleeds in the right parietal cortex, the left and right subcortical regions of the internal capsule, and the right centrum semiovale.            Clinical Summary/Interpretation:    Neurobehavioral/Neuropsychiatric Evaluation Interpretation: The review of systems indicates several neurological and neurocognitive deficits that suggest dysfunctions in specific cortical and subcortical regions associated with network pathologies. Memory impairments, such as general memory issues, short-term memory deficits, and working memory challenges, point to possible dysfunctions in the medial temporal lobe and prefrontal cortex. Anomia and word-finding difficulties suggest disruptions in language networks, likely involving the left hemisphere's language centers. Gait abnormalities, including imbalance and falls, along with bradykinesia, may implicate subcortical structures like the basal ganglia and cerebellum. Additionally, mood instability and emotional lability indicate potential frontal lobe and limbic system involvement, while organization difficulties and attention deficits suggest frontal lobe and executive network impairments.  BEHAV5+: Score 1/5 indicates minor behavioral  symptoms which align with observed neuropsychiatric findings.  Neurocognitive/Cognition-Focused Evaluation Interpretation: Memory predominant multidomain major cognitive impairment. Assessment completed in January 2025  MMSE 24/30: Score suggestive of mild cognitive impairment.  MOCA 20/30: Score suggestive of mild cognitive impairment.  Neurological Examination Interpretation: The neurological examination revealed deficits in arousal, orientation, and attention, indicating dysfunctions likely associated with the frontal and parietal cortices. Impaired serial processing and judgment suggest frontal lobe involvement, while eyelid apraxia and abnormal facial movements point to potential disruptions in the basal ganglia or associated motor pathways. Bradykinesia, resting tremor, and motor overflow are indicative of basal ganglia dysfunction, commonly seen in Parkinsonian syndromes. Abnormal gait patterns, such as difficulty rising, abnormal swing, and rigid-akinetic movements, along with impaired postural reflexes, suggest issues with both the basal ganglia and cerebellar networks, while sensory ataxia indicates potential cerebellar or sensory pathway impairments.  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 There are scattered subcortical white matter changes and at least three subcortical microbleeds, suggesting a mixed pathology. This may be consistent with neurodegeneration presenting as frontotemporal degeneration and cerebral amyloid angiopathy.  Radiologist Interpretation: 1. Moderate chronic microvascular ischemic disease. 2. Ventriculomegaly mildly out of proportion with the cerebral atrophy. Correlate with clinical symptomatology. 3. Patchy fluid signal intensity  within the right mastoid air cells, which could represent effusion or inflammatory changes of indeterminate age. Correlate clinically.    Billing Statement:    An Established-Patient Audio Only Telehealth Visit was requested by the provider with the family regarding the workup performed between the patient's last clinical encounter. A total of 45 minutes (from 01:00 PM to 01:45 PM) on 07/13/2025 was spent with the family and/or patient via audio-only telemedicine discussing the patient's case. The reason for the audio-only service rather than synchronous audio and video virtual visit was related to technical difficulties or patient preference/necessity.   The patient's location is: Home  Visit type: Virtual visit with audio only (telephone)  Each patient to whom I provide medical services by telemedicine is: (1) informed of the relationship between the physician and patient and the respective role of any other health care provider with respect to management of the patient; and (2) notified that they may decline to receive medical services by telemedicine and may withdraw from such care at any time. Patient verbally consented to receive this service via voice-only telephone call.               This note was dictated using the M*Modal Fluency Direct voice recognition program. Please be aware that word recognition errors may occasionally occur and might be overlooked during review.

## 2025-07-15 ENCOUNTER — OFFICE VISIT (OUTPATIENT)
Facility: CLINIC | Age: 80
End: 2025-07-15
Payer: MEDICARE

## 2025-07-15 DIAGNOSIS — I68.0 CEREBRAL AMYLOID ANGIOPATHY: ICD-10-CM

## 2025-07-15 DIAGNOSIS — E85.4 CEREBRAL AMYLOID ANGIOPATHY: ICD-10-CM

## 2025-07-15 DIAGNOSIS — E53.8 B12 DEFICIENCY: ICD-10-CM

## 2025-07-15 DIAGNOSIS — G62.9 POLYNEUROPATHY: ICD-10-CM

## 2025-07-15 DIAGNOSIS — G21.4 VASCULAR PARKINSONISM: ICD-10-CM

## 2025-07-15 DIAGNOSIS — F03.A0 MILD DEMENTIA, UNSPECIFIED DEMENTIA TYPE, UNSPECIFIED WHETHER BEHAVIORAL, PSYCHOTIC, OR MOOD DISTURBANCE OR ANXIETY: Primary | ICD-10-CM

## 2025-07-15 DIAGNOSIS — G31.9 NEURODEGENERATIVE COGNITIVE IMPAIRMENT: ICD-10-CM

## 2025-07-21 ENCOUNTER — TELEPHONE (OUTPATIENT)
Facility: CLINIC | Age: 80
End: 2025-07-21
Payer: MEDICARE

## 2025-07-22 NOTE — TELEPHONE ENCOUNTER
Copied from CRM #[id number]. Topic: General Inquiry - Patient Advice  >> Jul 21, 2025  3:57 PM Candice wrote:  Consult/Advisory     Name Of Caller:Doroteo Judge          Contact Preference:[phone], requesting a call back.        Nature of call:pt is calling in regards to some orders  for him to see Dr Fraser. Please advise.  >> Jul 21, 2025  4:32 PM MALI Hamm wrote:  Hey - clinic note from Saundra looks like he wants pt to be seen in [facility]  ----- Message -----  From: Candice Osei  Sent: 7/21/2025   4:01 PM CDT  To: Saundra Villagomez

## 2025-07-23 ENCOUNTER — TELEPHONE (OUTPATIENT)
Dept: NEUROLOGY | Facility: CLINIC | Age: 80
End: 2025-07-23
Payer: MEDICARE

## 2025-08-05 ENCOUNTER — TELEPHONE (OUTPATIENT)
Dept: CARDIOLOGY | Facility: CLINIC | Age: 80
End: 2025-08-05
Payer: MEDICARE

## 2025-08-05 ENCOUNTER — PATIENT MESSAGE (OUTPATIENT)
Dept: CARDIOLOGY | Facility: CLINIC | Age: 80
End: 2025-08-05
Payer: MEDICARE

## 2025-08-12 ENCOUNTER — TELEPHONE (OUTPATIENT)
Dept: CARDIOLOGY | Facility: CLINIC | Age: 80
End: 2025-08-12
Payer: MEDICARE

## 2025-08-25 ENCOUNTER — OFFICE VISIT (OUTPATIENT)
Dept: CARDIOLOGY | Facility: CLINIC | Age: 80
End: 2025-08-25
Payer: MEDICARE

## 2025-08-25 ENCOUNTER — TELEPHONE (OUTPATIENT)
Dept: CARDIOLOGY | Facility: CLINIC | Age: 80
End: 2025-08-25

## 2025-08-25 DIAGNOSIS — I25.118 ATHEROSCLEROSIS OF NATIVE CORONARY ARTERY OF NATIVE HEART WITH STABLE ANGINA PECTORIS: ICD-10-CM

## 2025-08-25 DIAGNOSIS — E78.00 HYPERCHOLESTEREMIA: ICD-10-CM

## 2025-08-25 DIAGNOSIS — I47.10 SUPRAVENTRICULAR TACHYCARDIA: ICD-10-CM

## 2025-08-25 DIAGNOSIS — I21.4 NON-ST ELEVATION MYOCARDIAL INFARCTION (NSTEMI): Primary | ICD-10-CM

## 2025-08-25 DIAGNOSIS — I10 PRIMARY HYPERTENSION: ICD-10-CM

## 2025-08-25 DIAGNOSIS — I50.32 CHRONIC DIASTOLIC HEART FAILURE: ICD-10-CM

## 2025-08-25 PROCEDURE — 99213 OFFICE O/P EST LOW 20 MIN: CPT | Mod: PBBFAC | Performed by: INTERNAL MEDICINE

## 2025-08-25 PROCEDURE — 99999 PR PBB SHADOW E&M-EST. PATIENT-LVL III: CPT | Mod: PBBFAC,,, | Performed by: INTERNAL MEDICINE

## 2025-08-25 RX ORDER — TICAGRELOR 90 MG/1
90 TABLET, FILM COATED ORAL 2 TIMES DAILY
COMMUNITY
Start: 2025-08-02

## 2025-09-01 ENCOUNTER — TELEPHONE (OUTPATIENT)
Dept: NEUROLOGY | Facility: CLINIC | Age: 80
End: 2025-09-01
Payer: MEDICARE

## (undated) DEVICE — HEMOSTAT VASC BAND REG 24CM

## (undated) DEVICE — CATH GUIDE LINER  V3 6F

## (undated) DEVICE — KIT ESSENTIALS W/ Y ADAPTER

## (undated) DEVICE — WIRE GUIDE SAFE-T-J .035 260CM

## (undated) DEVICE — GLIDESHEATH SLENDER SS 5FR10CM

## (undated) DEVICE — TRAY CORONARY CUSTOM BAPTIST

## (undated) DEVICE — CATH MPA2 INFINITI 4FR 100CM

## (undated) DEVICE — MANIFOLD PERCEPTOR MP 3P RH ON

## (undated) DEVICE — CATH BLLN WOLVERINE 15X2.5MM

## (undated) DEVICE — BAG DRAINAGE W/SPIKE

## (undated) DEVICE — KIT GLIDESHEATH SLEND 6FR 10CM

## (undated) DEVICE — J WIRE HI TORQUE PILOT 50X014

## (undated) DEVICE — Device

## (undated) DEVICE — KIT PROBE COVER WITH GEL

## (undated) DEVICE — KIT CUSTOM INFLATION DEV

## (undated) DEVICE — STOPCOCK 3 WAY MED PRESSURE

## (undated) DEVICE — CATH BLLN FG APEX MR 2.00X12MM

## (undated) DEVICE — CATH OPTITORQUE RADIAL 5FR

## (undated) DEVICE — DRAPE ANGIO BRACH 38X44IN

## (undated) DEVICE — CATH NC QUANTUM APEX MR 2.5X15

## (undated) DEVICE — GUIDE LAUNCHER 6FR EBU 3.5

## (undated) DEVICE — CATH BLLN APEX FLEX MR 1.5X12

## (undated) DEVICE — OMNIPAQUE 350 200ML